# Patient Record
Sex: FEMALE | Race: WHITE | NOT HISPANIC OR LATINO | Employment: OTHER | URBAN - NONMETROPOLITAN AREA
[De-identification: names, ages, dates, MRNs, and addresses within clinical notes are randomized per-mention and may not be internally consistent; named-entity substitution may affect disease eponyms.]

---

## 2023-03-27 LAB
ANION GAP IN SER/PLAS: 11 MMOL/L (ref 10–20)
CALCIUM (MG/DL) IN SER/PLAS: 9.3 MG/DL (ref 8.6–10.3)
CARBON DIOXIDE, TOTAL (MMOL/L) IN SER/PLAS: 27 MMOL/L (ref 21–32)
CHLORIDE (MMOL/L) IN SER/PLAS: 104 MMOL/L (ref 98–107)
CREATININE (MG/DL) IN SER/PLAS: 1.16 MG/DL (ref 0.5–1.05)
GFR FEMALE: 50 ML/MIN/1.73M2
GLUCOSE (MG/DL) IN SER/PLAS: 86 MG/DL (ref 74–99)
POTASSIUM (MMOL/L) IN SER/PLAS: 4.2 MMOL/L (ref 3.5–5.3)
SODIUM (MMOL/L) IN SER/PLAS: 138 MMOL/L (ref 136–145)
UREA NITROGEN (MG/DL) IN SER/PLAS: 25 MG/DL (ref 6–23)

## 2023-07-17 LAB
ANION GAP IN SER/PLAS: 9 MMOL/L (ref 10–20)
CALCIUM (MG/DL) IN SER/PLAS: 9.5 MG/DL (ref 8.6–10.3)
CARBON DIOXIDE, TOTAL (MMOL/L) IN SER/PLAS: 28 MMOL/L (ref 21–32)
CHLORIDE (MMOL/L) IN SER/PLAS: 105 MMOL/L (ref 98–107)
CREATININE (MG/DL) IN SER/PLAS: 1.14 MG/DL (ref 0.5–1.05)
ERYTHROCYTE DISTRIBUTION WIDTH (RATIO) BY AUTOMATED COUNT: 12.6 % (ref 11.5–14.5)
ERYTHROCYTE MEAN CORPUSCULAR HEMOGLOBIN CONCENTRATION (G/DL) BY AUTOMATED: 31 G/DL (ref 32–36)
ERYTHROCYTE MEAN CORPUSCULAR VOLUME (FL) BY AUTOMATED COUNT: 100 FL (ref 80–100)
ERYTHROCYTES (10*6/UL) IN BLOOD BY AUTOMATED COUNT: 4.23 X10E12/L (ref 4–5.2)
GFR FEMALE: 51 ML/MIN/1.73M2
GLUCOSE (MG/DL) IN SER/PLAS: 90 MG/DL (ref 74–99)
HEMATOCRIT (%) IN BLOOD BY AUTOMATED COUNT: 42.2 % (ref 36–46)
HEMOGLOBIN (G/DL) IN BLOOD: 13.1 G/DL (ref 12–16)
LEUKOCYTES (10*3/UL) IN BLOOD BY AUTOMATED COUNT: 5.6 X10E9/L (ref 4.4–11.3)
PARATHYRIN INTACT (PG/ML) IN SER/PLAS: 31.7 PG/ML (ref 18.5–88)
PLATELETS (10*3/UL) IN BLOOD AUTOMATED COUNT: 294 X10E9/L (ref 150–450)
POTASSIUM (MMOL/L) IN SER/PLAS: 3.9 MMOL/L (ref 3.5–5.3)
SODIUM (MMOL/L) IN SER/PLAS: 138 MMOL/L (ref 136–145)
UREA NITROGEN (MG/DL) IN SER/PLAS: 25 MG/DL (ref 6–23)

## 2023-09-05 LAB
INR IN PPP BY COAGULATION ASSAY EXTERNAL: 2.2
PROTHROMBIN TIME (PT) IN PPP BY COAGULATION ASSAY EXTERNAL: NORMAL SECONDS

## 2023-09-14 PROBLEM — M17.11 OSTEOARTHRITIS OF RIGHT KNEE: Status: ACTIVE | Noted: 2023-09-14

## 2023-09-14 PROBLEM — R26.9 GAIT DIFFICULTY: Status: ACTIVE | Noted: 2023-09-14

## 2023-09-14 PROBLEM — I73.9 PAD (PERIPHERAL ARTERY DISEASE) (CMS-HCC): Status: ACTIVE | Noted: 2023-09-14

## 2023-09-14 PROBLEM — I82.409 DVT (DEEP VENOUS THROMBOSIS) (MULTI): Status: ACTIVE | Noted: 2023-09-14

## 2023-09-14 PROBLEM — M72.2 PLANTAR FASCIITIS, LEFT: Status: ACTIVE | Noted: 2023-09-14

## 2023-09-14 PROBLEM — I26.99 PULMONARY EMBOLISM (MULTI): Status: ACTIVE | Noted: 2023-09-14

## 2023-09-14 RX ORDER — CILOSTAZOL 50 MG/1
TABLET ORAL
COMMUNITY
Start: 2023-04-10 | End: 2023-10-04 | Stop reason: ALTCHOICE

## 2023-09-14 RX ORDER — ROSUVASTATIN CALCIUM 5 MG
0.5 TABLET ORAL DAILY
COMMUNITY
Start: 2019-10-14 | End: 2023-12-19 | Stop reason: SDUPTHER

## 2023-09-14 RX ORDER — VIT C/E/ZN/COPPR/LUTEIN/ZEAXAN 250MG-90MG
25 CAPSULE ORAL 2 TIMES DAILY
COMMUNITY

## 2023-09-14 RX ORDER — LOSARTAN POTASSIUM 25 MG/1
1 TABLET, FILM COATED ORAL DAILY
COMMUNITY

## 2023-09-14 RX ORDER — CARVEDILOL 3.12 MG/1
3.12 TABLET, FILM COATED ORAL 2 TIMES DAILY
COMMUNITY
Start: 2019-10-14 | End: 2023-12-19 | Stop reason: SDUPTHER

## 2023-09-14 RX ORDER — OFLOXACIN 3 MG/ML
SOLUTION/ DROPS OPHTHALMIC
COMMUNITY
Start: 2023-03-03 | End: 2023-10-04 | Stop reason: ALTCHOICE

## 2023-09-14 RX ORDER — LORAZEPAM 1 MG/1
1 TABLET ORAL NIGHTLY
COMMUNITY
End: 2023-10-04 | Stop reason: ALTCHOICE

## 2023-09-14 RX ORDER — EPINEPHRINE 0.22MG
100 AEROSOL WITH ADAPTER (ML) INHALATION DAILY
COMMUNITY

## 2023-09-14 RX ORDER — ASPIRIN 81 MG/1
1 TABLET ORAL DAILY
COMMUNITY

## 2023-09-14 RX ORDER — LEVOTHYROXINE SODIUM 50 UG/1
1 TABLET ORAL DAILY
COMMUNITY
End: 2023-12-19 | Stop reason: SDUPTHER

## 2023-09-14 RX ORDER — WARFARIN SODIUM 5 MG/1
1 TABLET ORAL DAILY
COMMUNITY
Start: 2022-06-14 | End: 2023-11-29 | Stop reason: WASHOUT

## 2023-09-14 RX ORDER — FAMOTIDINE 20 MG/1
20 TABLET, FILM COATED ORAL 2 TIMES DAILY
COMMUNITY
End: 2023-10-18 | Stop reason: WASHOUT

## 2023-09-14 RX ORDER — BUPROPION HYDROCHLORIDE 150 MG/1
150 TABLET, EXTENDED RELEASE ORAL 2 TIMES DAILY
COMMUNITY
Start: 2020-07-13 | End: 2023-12-19 | Stop reason: SDUPTHER

## 2023-09-14 RX ORDER — CARBIDOPA AND LEVODOPA 25; 250 MG/1; MG/1
1 TABLET ORAL 3 TIMES DAILY
COMMUNITY
Start: 2019-10-14 | End: 2023-12-19 | Stop reason: ALTCHOICE

## 2023-09-14 RX ORDER — VALACYCLOVIR HYDROCHLORIDE 500 MG/1
1000 TABLET, FILM COATED ORAL DAILY
COMMUNITY

## 2023-09-14 RX ORDER — FLUOXETINE HYDROCHLORIDE 20 MG/1
1 CAPSULE ORAL DAILY
COMMUNITY
Start: 2019-07-15 | End: 2023-12-19 | Stop reason: SDUPTHER

## 2023-09-14 RX ORDER — LEVOTHYROXINE SODIUM 25 UG/1
1 TABLET ORAL DAILY
COMMUNITY
Start: 2020-10-12 | End: 2023-10-04

## 2023-09-27 DIAGNOSIS — Z86.711 PERSONAL HISTORY OF PE (PULMONARY EMBOLISM): Primary | ICD-10-CM

## 2023-10-04 ENCOUNTER — ANTICOAGULATION - WARFARIN VISIT (OUTPATIENT)
Dept: PHARMACY | Facility: HOSPITAL | Age: 73
End: 2023-10-04
Payer: MEDICARE

## 2023-10-04 DIAGNOSIS — Z86.711 PERSONAL HISTORY OF PE (PULMONARY EMBOLISM): Primary | ICD-10-CM

## 2023-10-04 LAB
POC INR: 3
POC PROTHROMBIN TIME: NORMAL

## 2023-10-04 PROCEDURE — 99211 OFF/OP EST MAY X REQ PHY/QHP: CPT | Performed by: PHARMACIST

## 2023-10-04 PROCEDURE — 85610 PROTHROMBIN TIME: CPT | Mod: QW

## 2023-10-04 RX ORDER — CALCIUM CARBONATE/VITAMIN D3 600MG-5MCG
1 TABLET ORAL DAILY
COMMUNITY

## 2023-10-04 NOTE — PROGRESS NOTES
This is a 4 week follow up.    Last INR 2.2 on warfarin 40 mg weekly. No changes were made at that time.    Today, pt denies missed doses, medication/diet changes, no OTC/herbal supplement changes, CP/SOB, fatigue, bleeding or bruising since last visit. Dose verified.    We reviewed and reinforced steady diet and signs and symptoms of VTE. Pt will be vigilant to any increase in severe bruising or bleeding and instructed to call clinic with any questions, concerns, changes, or bleed prior to next visit.    Plan:  INR still in range but on the higher side. She denies any issues or changes so I will leave dose alone today.  Recheck in 2 weeks.

## 2023-10-18 ENCOUNTER — OFFICE VISIT (OUTPATIENT)
Dept: CARDIOLOGY | Facility: CLINIC | Age: 73
End: 2023-10-18
Payer: MEDICARE

## 2023-10-18 VITALS
BODY MASS INDEX: 33.63 KG/M2 | HEART RATE: 71 BPM | HEIGHT: 64 IN | WEIGHT: 197 LBS | DIASTOLIC BLOOD PRESSURE: 64 MMHG | OXYGEN SATURATION: 95 % | SYSTOLIC BLOOD PRESSURE: 124 MMHG

## 2023-10-18 DIAGNOSIS — I73.9 PAD (PERIPHERAL ARTERY DISEASE) (CMS-HCC): Primary | ICD-10-CM

## 2023-10-18 DIAGNOSIS — E78.5 HYPERLIPIDEMIA, UNSPECIFIED HYPERLIPIDEMIA TYPE: ICD-10-CM

## 2023-10-18 PROBLEM — D68.61 ANTIPHOSPHOLIPID ANTIBODY SYNDROME (MULTI): Status: ACTIVE | Noted: 2023-10-18

## 2023-10-18 PROCEDURE — 99213 OFFICE O/P EST LOW 20 MIN: CPT | Performed by: STUDENT IN AN ORGANIZED HEALTH CARE EDUCATION/TRAINING PROGRAM

## 2023-10-18 PROCEDURE — 1159F MED LIST DOCD IN RCRD: CPT | Performed by: STUDENT IN AN ORGANIZED HEALTH CARE EDUCATION/TRAINING PROGRAM

## 2023-10-18 PROCEDURE — 1036F TOBACCO NON-USER: CPT | Performed by: STUDENT IN AN ORGANIZED HEALTH CARE EDUCATION/TRAINING PROGRAM

## 2023-10-18 RX ORDER — EZETIMIBE 10 MG/1
10 TABLET ORAL DAILY
Qty: 90 TABLET | Refills: 3 | Status: SHIPPED | OUTPATIENT
Start: 2023-10-18 | End: 2024-10-17

## 2023-10-18 NOTE — PROGRESS NOTES
CC: CV follow up    History of Present Illness  Marine Luna is a 73 y.o. 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     She has been followed with Dr. 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 She is on Coumadin.     She participated in PAD rehab which she reports helped her walking capacity a lot.    She denies any bleeding issues.  Denies claudication, rest pain or tissue loss.    Past medical history  As above      Social History     Tobacco Use    Smoking status: Former     Packs/day: 1.00     Years: 25.00     Additional pack years: 0.00     Total pack years: 25.00     Types: Cigarettes     Quit date: 2004     Years since quittin.8    Smokeless tobacco: Never   Substance Use Topics    Alcohol use: Yes    Drug use: Never       Family History   Problem Relation Name Age of Onset    Hypertension Mother      Other (valvur heart disease) Mother      Hypertension Father      Heart attack Father      Breast cancer Sister         Review of Systems  As per HPI, all other systems reviewed and negative.    Outpatient Medications:  Current Outpatient Medications   Medication Instructions    aspirin 81 mg EC tablet 1 tablet, oral, Daily    buPROPion SR (WELLBUTRIN SR) 150 mg, oral, 2 times daily    calcium carbonate-vitamin D3 600 mg-5 mcg (200 unit) tablet 1 tablet, oral, Daily    carbidopa-levodopa (Sinemet)  mg tablet 1 tablet, oral, 3 times daily    cholecalciferol (VITAMIN D-3) 25 mcg, oral, 2 times daily    coenzyme Q-10 100 mg, oral, Daily    Coreg 3.125 mg, oral, 2 times daily    Cozaar 25 mg tablet 1 tablet, oral, Daily    Crestor 5 mg tablet 0.5 tablets, oral, Daily    docosahexaenoic acid/epa  (FISH OIL ORAL) USE AS DIRECTED.    ezetimibe (ZETIA) 10 mg, oral, Daily    fexofenadine HCl (ALLEGRA ORAL) 1 tablet, oral, Daily    FLUoxetine (PROzac) 20 mg capsule 1 tablet, oral, Daily    levothyroxine (Synthroid, Levoxyl) 50 mcg tablet 1 tablet, oral, Daily    valACYclovir (VALTREX) 1,000 mg, oral, Daily    warfarin (Coumadin) 5 mg tablet 1 tablet, oral, Daily         Vitals:  Vitals:    10/18/23 1428   BP: 124/64   Pulse: 71   SpO2: 95%       Physical Exam:  General: NAD, well-appearing  HEENT: moist mucous membranes, no jaundice  Neck: No JVD, no carotid bruit  Lungs: CTA kendra, no wheezing or rales  Cardiac: RRR, no murmurs  Abdomen: soft, non-tender, non-distended  Extremities: 2+ radial pulses, no edema, no wounds  Skin: warm, dry  Neurologic: AAOx3,  no focal deficits      Assessment/Plan     #PAD, DVT/PE  -Exercise ABIs without significant disease. Continue medical therapy with anticoagulation  -Continue compression stockings for minimal leg swelling  -Echocardiogram with normal RV size and function    #Dyslipidemia  -LDL not at goal  -Reports she can only tolerate crestor 5mg every other day  -Will add ezetimibe          Daniela Gates MD [facility]  Interventional Cardiology  Endovascular Interventions  [email or web address]    Thank you for allowing me to participate in the care of this patient. Please do not hesitate to contact me with any further questions or concerns.

## 2023-11-01 ENCOUNTER — ANTICOAGULATION - WARFARIN VISIT (OUTPATIENT)
Dept: PHARMACY | Facility: HOSPITAL | Age: 73
End: 2023-11-01
Payer: MEDICARE

## 2023-11-01 DIAGNOSIS — Z86.711 PERSONAL HISTORY OF PE (PULMONARY EMBOLISM): Primary | ICD-10-CM

## 2023-11-01 LAB
POC INR: 2.5
POC PROTHROMBIN TIME: NORMAL

## 2023-11-01 PROCEDURE — 85610 PROTHROMBIN TIME: CPT | Mod: QW

## 2023-11-01 PROCEDURE — 99211 OFF/OP EST MAY X REQ PHY/QHP: CPT | Performed by: PHARMACIST

## 2023-11-01 NOTE — PROGRESS NOTES
Pt presents to anticoag clinic for 4 week INR check.  Last INR 3 on warfarin 40 mg weekly. No changes were made at that time.     Today, pt denies missed doses, medication/diet changes, no OTC/herbal supplement changes, CP/SOB, fatigue, bleeding or bruising since last visit. Dose verified.    We reviewed and reinforced steady diet and signs and symptoms of VTE. Pt will be vigilant to any increase in severe bruising or bleeding and instructed to call clinic with any questions, concerns, changes, or bleed prior to next visit.    Plan:  Continue with current warfarin dose.  INR in 4 weeks.  Maintain consistent vegetable intake.  Continue monitoring for any troubling bruising or bleeding and call with any medication changes or concerns.    Pt handout given with above information

## 2023-11-02 ENCOUNTER — APPOINTMENT (OUTPATIENT)
Dept: PRIMARY CARE | Facility: CLINIC | Age: 73
End: 2023-11-02
Payer: MEDICARE

## 2023-11-15 ENCOUNTER — OFFICE VISIT (OUTPATIENT)
Dept: ORTHOPEDIC SURGERY | Facility: CLINIC | Age: 73
End: 2023-11-15
Payer: MEDICARE

## 2023-11-15 DIAGNOSIS — M17.11 ARTHRITIS OF RIGHT KNEE: Primary | ICD-10-CM

## 2023-11-15 PROCEDURE — 1160F RVW MEDS BY RX/DR IN RCRD: CPT | Performed by: NURSE PRACTITIONER

## 2023-11-15 PROCEDURE — 99214 OFFICE O/P EST MOD 30 MIN: CPT | Performed by: NURSE PRACTITIONER

## 2023-11-15 PROCEDURE — 1036F TOBACCO NON-USER: CPT | Performed by: NURSE PRACTITIONER

## 2023-11-15 PROCEDURE — 1159F MED LIST DOCD IN RCRD: CPT | Performed by: NURSE PRACTITIONER

## 2023-11-15 PROCEDURE — 20611 DRAIN/INJ JOINT/BURSA W/US: CPT | Performed by: NURSE PRACTITIONER

## 2023-11-15 RX ORDER — TRIAMCINOLONE ACETONIDE 40 MG/ML
40 INJECTION, SUSPENSION INTRA-ARTICULAR; INTRAMUSCULAR
Status: COMPLETED | OUTPATIENT
Start: 2023-11-15 | End: 2023-11-15

## 2023-11-15 RX ADMIN — TRIAMCINOLONE ACETONIDE 40 MG: 40 INJECTION, SUSPENSION INTRA-ARTICULAR; INTRAMUSCULAR at 10:26

## 2023-11-15 ASSESSMENT — ENCOUNTER SYMPTOMS
HEMATOLOGIC/LYMPHATIC NEGATIVE: 1
NEUROLOGICAL NEGATIVE: 1
ENDOCRINE NEGATIVE: 1
ARTHRALGIAS: 1
PSYCHIATRIC NEGATIVE: 1
CONSTITUTIONAL NEGATIVE: 1
CARDIOVASCULAR NEGATIVE: 1
RESPIRATORY NEGATIVE: 1

## 2023-11-15 ASSESSMENT — PAIN - FUNCTIONAL ASSESSMENT: PAIN_FUNCTIONAL_ASSESSMENT: NO/DENIES PAIN

## 2023-11-15 NOTE — ASSESSMENT & PLAN NOTE
We discussed benefits of cortisone injection to boost effects of the recent viscosupplementation. INR results today were of 2.5. A cortisone injection was provided and tolerated well. Positive lidocaine suppression. We reviewed as needed use of Tylenol and topical diclofenac gel. Also encouraged continued use of the compression sleeve or hinged brace with any activities that would require repetitive motion and twisting of the knee. Continue home exercises for knee OA, was encouraged to initiate in 1 week and advance as tolerated. We discussed gel injections.  Patient states she does get longer relief with gel, last dosing was earlier this year.  In April with Euflexxa.  Patient is interested in pursuing in the future.  Follow-up here in 3 months, we did discuss if patient feels she would like to pursue the gel at that time to notify via meevl approximately 2 weeks prior to the appointment and we will submit request for gel injections.  Patient is in agreement with plan of care.  This note was generated using Dragon software. It may contain errors in wording, punctuation or spelling.   Rx for compounded joint formula 8E completed and sent to [facility] pharmacy with following changed to standard formula: Lidocaine 4% and no Prilocaine.

## 2023-11-15 NOTE — PROGRESS NOTES
Subjective    Patient ID: Marine Luna is a 73 y.o. female.    Chief Complaint: Follow-up of the Right Knee    HPI  Marine is a pleasant 73-year-old female presenting today for 3-month for R knee pain, OA flare.  Patient with symptom relief with compounded Rx, requesting refill.  States she uses it every day.  Patient takes Tylenol on as needed basis.  Patient recently had her INR checked on 11/1/2023 and reports 2.5 reading.  Patient is interested in pursuing cortisone injection for symptom control.  Patient noticed flare with increased activity and weather change.  Denies any new injury or falls.    Review of Systems   Constitutional: Negative.    HENT: Negative.     Respiratory: Negative.     Cardiovascular: Negative.    Endocrine: Negative.    Musculoskeletal:  Positive for arthralgias.   Skin: Negative.    Neurological: Negative.    Hematological: Negative.    Psychiatric/Behavioral: Negative.         Objective   Right Knee Exam     Tenderness   The patient is experiencing tenderness in the medial joint line.    Range of Motion   Extension:  normal   Flexion:  120     Tests   Malika:  Medial - negative Lateral - negative  Varus: negative Valgus: negative  Lachman:  Anterior - negative      Drawer:  Anterior - negative    Posterior - negative    Other   Sensation: normal  Pulse: present  Swelling: mild    Comments:  Mild edema to medial aspect of the knee extends approximately 3 inches distally.  Full ROM of distal joints with no sx aggravation distal motor and sensory intact, cap refill at 2 seconds.  Mild aggravation of medial knee discomfort with testing, no laxity noted.          Image Results:  XR knee complete 4 or more views  Narrative: Interpreted By:  LISA SANCHEZ MD  MRN: [id number]  Patient Name: MARINE LUNA     STUDY:  KNEE; COMPLT, 4 OR MORE VIEWS     INDICATION:  PAIN  M17.11: Osteoarthritis of right knee.     COMPARISON:  None     ACCESSION NUMBER(S):  [id number]     ORDERING  CLINICIAN:  ANTHONY HESS     FINDINGS:  November 16, 2020 Advanced osteoarthritis right knee mostly medial  compartment. No fracture seen. No osseous lesions.     Impression: Advanced osteoarthritis right knee particularly in the medial  compartment.    .lab  Patient ID: Marine Luna is a 73 y.o. female.    L Inj/Asp: R knee on 11/15/2023 10:26 AM  Indications: pain and joint swelling  Details: 22 G needle, ultrasound-guided superolateral approach  Medications: 40 mg triamcinolone acetonide 40 mg/mL  Outcome: tolerated well, no immediate complications    We discussed risk and benefits of cortisone injection, patient wishes to proceed via verbal consent.  Skin was prepped with Betadine, Vapocoolant spray and alcohol.  Direct visualization using high-frequency linear probe of ultrasound was utilized to administer the injection of 40 mg Kenalog, 3 cc 1% lidocaine and 3 cc of bupivacaine.  Patient tolerated injection well lidocaine suppression.  Images were saved under MRN number into the PACS system.   Procedure, treatment alternatives, risks and benefits explained, specific risks discussed. Consent was given by the patient. Immediately prior to procedure a time out was called to verify the correct patient, procedure, equipment, support staff and site/side marked as required. Patient was prepped and draped in the usual sterile fashion.       Lab review: INR 11/1/2023 2.50    Assessment/Plan   Encounter Diagnoses:  Arthritis of right knee  Problem List Items Addressed This Visit             ICD-10-CM    Arthritis of right knee - Primary M17.11     We discussed benefits of cortisone injection to boost effects of the recent viscosupplementation. INR results today were of 2.5. A cortisone injection was provided and tolerated well. Positive lidocaine suppression. We reviewed as needed use of Tylenol and topical diclofenac gel. Also encouraged continued use of the compression sleeve or hinged brace with any  activities that would require repetitive motion and twisting of the knee. Continue home exercises for knee OA, was encouraged to initiate in 1 week and advance as tolerated. We discussed gel injections.  Patient states she does get longer relief with gel, last dosing was earlier this year.  In April with Euflexxa.  Patient is interested in pursuing in the future.  Follow-up here in 3 months, we did discuss if patient feels she would like to pursue the gel at that time to notify via [employer] approximately 2 weeks prior to the appointment and we will submit request for gel injections.  Patient is in agreement with plan of care.  This note was generated using Dragon software. It may contain errors in wording, punctuation or spelling.             Relevant Orders    Point of Care Ultrasound (Completed)    Follow Up In Orthopaedic Surgery

## 2023-11-21 ENCOUNTER — TELEPHONE (OUTPATIENT)
Dept: ORTHOPEDIC SURGERY | Facility: CLINIC | Age: 73
End: 2023-11-21
Payer: MEDICARE

## 2023-11-21 NOTE — TELEPHONE ENCOUNTER
Patient called and stated that the Diclofenac gel discussed in her visit was not at the pharmacy when she called. Please send the RX and update the patient.

## 2023-11-29 ENCOUNTER — ANTICOAGULATION - WARFARIN VISIT (OUTPATIENT)
Dept: PHARMACY | Facility: HOSPITAL | Age: 73
End: 2023-11-29
Payer: MEDICARE

## 2023-11-29 DIAGNOSIS — Z86.711 PERSONAL HISTORY OF PE (PULMONARY EMBOLISM): Primary | ICD-10-CM

## 2023-11-29 LAB
POC INR: 2.2
POC PROTHROMBIN TIME: NORMAL

## 2023-11-29 PROCEDURE — 85610 PROTHROMBIN TIME: CPT

## 2023-11-29 PROCEDURE — 99211 OFF/OP EST MAY X REQ PHY/QHP: CPT | Mod: 25 | Performed by: PHARMACIST

## 2023-11-29 RX ORDER — WARFARIN SODIUM 5 MG/1
TABLET ORAL
Qty: 135 TABLET | Refills: 3 | Status: SHIPPED | OUTPATIENT
Start: 2023-11-29 | End: 2024-07-26

## 2023-11-29 NOTE — PROGRESS NOTES
This is a 4 week follow up.    Last INR 2.5 on warfarin 40 mg weekly. No changes were made at that time.     Today, pt denies missed doses, medication/diet changes, no OTC/herbal supplement changes, CP/SOB, fatigue, bleeding or bruising since last visit. Dose verified.    We reviewed and reinforced steady diet and signs and symptoms of VTE. Pt will be vigilant to any increase in severe bruising or bleeding and instructed to call clinic with any questions, concerns, changes, or bleed prior to next visit.    Plan:  Continue with current warfarin dose.  INR in 4 weeks.  New RX sent to [facility].  Maintain consistent vegetable intake.  Continue monitoring for any troubling bruising or bleeding and call with any medication changes or concerns.    Pt handout given with above information

## 2023-12-13 ENCOUNTER — LAB (OUTPATIENT)
Dept: LAB | Facility: LAB | Age: 73
End: 2023-12-13
Payer: MEDICARE

## 2023-12-13 DIAGNOSIS — I82.90 DEEP VEIN THROMBOSIS (DVT) OF NON-EXTREMITY VEIN, UNSPECIFIED CHRONICITY: ICD-10-CM

## 2023-12-13 LAB
ALBUMIN SERPL BCP-MCNC: 4.4 G/DL (ref 3.4–5)
ALP SERPL-CCNC: 55 U/L (ref 33–136)
ALT SERPL W P-5'-P-CCNC: 13 U/L (ref 7–45)
ANION GAP SERPL CALC-SCNC: 12 MMOL/L (ref 10–20)
AST SERPL W P-5'-P-CCNC: 13 U/L (ref 9–39)
BASOPHILS # BLD AUTO: 0.06 X10*3/UL (ref 0–0.1)
BASOPHILS NFR BLD AUTO: 0.9 %
BILIRUB SERPL-MCNC: 0.3 MG/DL (ref 0–1.2)
BUN SERPL-MCNC: 23 MG/DL (ref 6–23)
CALCIUM SERPL-MCNC: 9.3 MG/DL (ref 8.6–10.3)
CHLORIDE SERPL-SCNC: 103 MMOL/L (ref 98–107)
CO2 SERPL-SCNC: 28 MMOL/L (ref 21–32)
CREAT SERPL-MCNC: 1.15 MG/DL (ref 0.5–1.05)
D DIMER PPP FEU-MCNC: 221 NG/ML FEU
EOSINOPHIL # BLD AUTO: 0.27 X10*3/UL (ref 0–0.4)
EOSINOPHIL NFR BLD AUTO: 4 %
ERYTHROCYTE [DISTWIDTH] IN BLOOD BY AUTOMATED COUNT: 13.4 % (ref 11.5–14.5)
GFR SERPL CREATININE-BSD FRML MDRD: 51 ML/MIN/1.73M*2
GLUCOSE SERPL-MCNC: 97 MG/DL (ref 74–99)
HCT VFR BLD AUTO: 42.7 % (ref 36–46)
HGB BLD-MCNC: 13.2 G/DL (ref 12–16)
IMM GRANULOCYTES # BLD AUTO: 0.03 X10*3/UL (ref 0–0.5)
IMM GRANULOCYTES NFR BLD AUTO: 0.4 % (ref 0–0.9)
LYMPHOCYTES # BLD AUTO: 1.65 X10*3/UL (ref 0.8–3)
LYMPHOCYTES NFR BLD AUTO: 24.3 %
MCH RBC QN AUTO: 30.3 PG (ref 26–34)
MCHC RBC AUTO-ENTMCNC: 30.9 G/DL (ref 32–36)
MCV RBC AUTO: 98 FL (ref 80–100)
MONOCYTES # BLD AUTO: 0.65 X10*3/UL (ref 0.05–0.8)
MONOCYTES NFR BLD AUTO: 9.6 %
NEUTROPHILS # BLD AUTO: 4.14 X10*3/UL (ref 1.6–5.5)
NEUTROPHILS NFR BLD AUTO: 60.8 %
NRBC BLD-RTO: 0 /100 WBCS (ref 0–0)
PLATELET # BLD AUTO: 303 X10*3/UL (ref 150–450)
POTASSIUM SERPL-SCNC: 4.2 MMOL/L (ref 3.5–5.3)
PROT SERPL-MCNC: 6.7 G/DL (ref 6.4–8.2)
RBC # BLD AUTO: 4.35 X10*6/UL (ref 4–5.2)
SODIUM SERPL-SCNC: 139 MMOL/L (ref 136–145)
WBC # BLD AUTO: 6.8 X10*3/UL (ref 4.4–11.3)

## 2023-12-13 PROCEDURE — 80053 COMPREHEN METABOLIC PANEL: CPT

## 2023-12-13 PROCEDURE — 85025 COMPLETE CBC W/AUTO DIFF WBC: CPT

## 2023-12-13 PROCEDURE — 85379 FIBRIN DEGRADATION QUANT: CPT

## 2023-12-13 PROCEDURE — 36415 COLL VENOUS BLD VENIPUNCTURE: CPT

## 2023-12-13 NOTE — PROGRESS NOTES
Patient ID: Marine Luna is a 73 y.o. female.    Subjective    HPI  Visit Type: Follow Up Visit      History of Present Illness:      ID Statement:    MARINE LUNA is a 73 year old Female        Chief Complaint: Evaluation for PE   Interval History:    Referred by Jose C Liang MD      Reason for referral: PE      HPI  71 year old woman with hx of GERD, HL, depression/anxiety, HTN who presented to ER on 2/7/22 with back pain and abdominal pain, a CT C/A/P  showed acute bilateral PE with b/l GGO possibly infarcts, she had also dopplers of b/l LE that showed b/l acute DVTs. She was started on heparin and discharged on Eliquis, she was prior to that on Plavix and cilostazol which was stopped.      she had pain in the LE b/l, then se started having difficulty breathing, she was finally convinced to go to the hospital where she had above investigations      in 2007 he had similar symptoms, with blue toes, but at that time she had embolic event to the toe     she feels she is improved now her back pain is better and her breathing is better   her feet and legs are hurting more , attributes that to not taking plavix or cilostazol   she is active and up on her feet but has WILSON     she has been eating well,   no weight loss   tolerating eliquis well without bleeding   no fever or infections   no night sweats      she had MMG last year that was negative and will have her yearly one this   no recent pap smear   had colonoscopy in 2013      she was found to have positive APLA and was switched to coumadin      interval history - 12/14/23     She is on warfarin- 5mg everyday and on Friday and Monday takes 1.5 pills  INR has been mostly therapeutic   continues to bruise easy     Energy is doing well, remains to stay active by doing [employer]  Appetite is good, eating and drinking well   remains to have WILSON going up stairs, otherwise no breathing issues   No chest pain or pressure      No hematochezia or melena  No urinary  issues, no hematuria      Knee and ankle pain, unchanged   restless leg remains unchanged, takes sinemet for control   No swelling or LLE pain, continues to wear compression stockings     Objective    BSA: There is no height or weight on file to calculate BSA.  There were no vitals taken for this visit.     Physical Exam  Vitals and nursing note reviewed.   Constitutional:       General: She is not in acute distress.     Appearance: Normal appearance.   HENT:      Mouth/Throat:      Mouth: Mucous membranes are moist.      Pharynx: Oropharynx is clear.   Eyes:      General: No scleral icterus.     Extraocular Movements: Extraocular movements intact.      Conjunctiva/sclera: Conjunctivae normal.   Cardiovascular:      Rate and Rhythm: Normal rate and regular rhythm.      Pulses: Normal pulses.      Heart sounds: Normal heart sounds.   Pulmonary:      Effort: Pulmonary effort is normal. No respiratory distress.      Breath sounds: Normal breath sounds.   Abdominal:      General: There is no distension.      Palpations: Abdomen is soft. There is no mass.      Tenderness: There is no abdominal tenderness.   Musculoskeletal:         General: Normal range of motion.      Cervical back: Normal range of motion.   Skin:     General: Skin is warm and dry.   Neurological:      General: No focal deficit present.      Mental Status: She is alert and oriented to person, place, and time.   Psychiatric:         Mood and Affect: Mood normal.         Behavior: Behavior normal.         Thought Content: Thought content normal.         Judgment: Judgment normal.         Performance Status:  Asymptomatic      Assessment/Plan      Assessment:    1. Pulmonary embolism/ DVT - likely APLS     unprovoked DVT/ PE      recommend lifelong anticoagulation with periodic assessment of bleeding risk , continue Eliquis 5 mg twice a day, after 6-12 months of anticoagulation the dose can reduced to 2.5 mg twice a day      plan to check D-dimer, APLA ,  PTG an FVL mutation, protein C&S , AT levels due to hx of both arterial and venous events      5/31/22- the evaluation showed strongly positive triple positive APLA   her repeat testing confirms diagnosis      6/15/23- Remains on Warfarin and follows with Coumadin Clinic. PT/INRs have mostly been with in therapeutic range up until recently which was PT- 21.5, INR - 1.8. Patient was dose adjusted accordingly by coumadin clinic.  She denies any abnormal bleeding  or bruising problems. Her D-dimer is with in normal limits. She is tolerating warfarin with no issues. Will continue to monitor pt.     Hgb is stable at 13.2. Previously discussed checking additional lab for nutritional deficiency such as iron or b12. Since her labs have normalized, will hold on any additional labs at this time. Will continue to periodically see patient for bleeding risk  assessments.      Educated patient on calling or  visit closest ER with any persistent headaches, visual disturbances, weakness, numbness, severe bleeding/bruising or any concerning signs or symptoms.  - May need to wear a medical alert bracelet stating the use of anticoagulants.  - To call and schedule appt with one of our physicians for pre-operative clearance and recommendations at least 1 month before any invasive procedures/surgeries    12/14/23- patient was in office today for routine follow-up. Remains on warfarin. PT/INRs have been stable. No bleeding risk or concerns. Patient remains to bruise easily. Over patient is doing well.     Continue on warfarin- managed by coumadin clinic. Will continue to follow patient for periodic bleeding risk assessments.     Labs reviewed and remains stable.  12-13-23 Hg 13.2, WBC 6.8, Plts 303k, D-dimer 221        2. History of PAD   continue vascular follow up   she is restarted on cilostazol but not plavix      RTC 6 m with labs (CBC w/diff, CMP, D-dimer)          Rona Lang, ABRAM-CNP

## 2023-12-14 ENCOUNTER — OFFICE VISIT (OUTPATIENT)
Dept: HEMATOLOGY/ONCOLOGY | Facility: CLINIC | Age: 73
End: 2023-12-14
Payer: MEDICARE

## 2023-12-14 VITALS
OXYGEN SATURATION: 95 % | WEIGHT: 195.2 LBS | DIASTOLIC BLOOD PRESSURE: 67 MMHG | HEART RATE: 63 BPM | BODY MASS INDEX: 33.32 KG/M2 | HEIGHT: 64 IN | TEMPERATURE: 96.6 F | SYSTOLIC BLOOD PRESSURE: 144 MMHG | RESPIRATION RATE: 16 BRPM

## 2023-12-14 DIAGNOSIS — I82.90 DEEP VEIN THROMBOSIS (DVT) OF NON-EXTREMITY VEIN, UNSPECIFIED CHRONICITY: Primary | ICD-10-CM

## 2023-12-14 PROCEDURE — 1036F TOBACCO NON-USER: CPT

## 2023-12-14 PROCEDURE — 99213 OFFICE O/P EST LOW 20 MIN: CPT

## 2023-12-14 PROCEDURE — 1126F AMNT PAIN NOTED NONE PRSNT: CPT

## 2023-12-14 PROCEDURE — 1159F MED LIST DOCD IN RCRD: CPT

## 2023-12-14 PROCEDURE — 1160F RVW MEDS BY RX/DR IN RCRD: CPT

## 2023-12-14 ASSESSMENT — PAIN SCALES - GENERAL: PAINLEVEL: 0-NO PAIN

## 2023-12-14 NOTE — PROGRESS NOTES
Registrant scheduled pt for follow up  Pt instructed to get labs at the hosp prior  Reviewed AVS with patient- patient verbalizes understanding

## 2023-12-14 NOTE — PATIENT INSTRUCTIONS
Remain on Coumadin, managed with Anticoagulation Clinic     Plan:  RTC in 6 months with labs prior

## 2023-12-19 ENCOUNTER — OFFICE VISIT (OUTPATIENT)
Dept: PRIMARY CARE | Facility: CLINIC | Age: 73
End: 2023-12-19
Payer: MEDICARE

## 2023-12-19 VITALS
HEART RATE: 83 BPM | HEIGHT: 64 IN | BODY MASS INDEX: 33.29 KG/M2 | SYSTOLIC BLOOD PRESSURE: 138 MMHG | WEIGHT: 195 LBS | DIASTOLIC BLOOD PRESSURE: 70 MMHG

## 2023-12-19 DIAGNOSIS — Z23 NEED FOR INFLUENZA VACCINATION: ICD-10-CM

## 2023-12-19 DIAGNOSIS — E03.9 ACQUIRED HYPOTHYROIDISM: Primary | ICD-10-CM

## 2023-12-19 DIAGNOSIS — G25.81 RLS (RESTLESS LEGS SYNDROME): ICD-10-CM

## 2023-12-19 DIAGNOSIS — I82.4Y9 DEEP VEIN THROMBOSIS (DVT) OF PROXIMAL LOWER EXTREMITY, UNSPECIFIED CHRONICITY, UNSPECIFIED LATERALITY (MULTI): ICD-10-CM

## 2023-12-19 DIAGNOSIS — D68.61 ANTIPHOSPHOLIPID ANTIBODY SYNDROME (MULTI): ICD-10-CM

## 2023-12-19 DIAGNOSIS — R19.5 POSITIVE COLORECTAL CANCER SCREENING USING COLOGUARD TEST: ICD-10-CM

## 2023-12-19 DIAGNOSIS — R53.82 CHRONIC FATIGUE: ICD-10-CM

## 2023-12-19 DIAGNOSIS — Z12.11 SCREENING FOR COLON CANCER: ICD-10-CM

## 2023-12-19 DIAGNOSIS — Z78.0 ENCOUNTER FOR OSTEOPOROSIS SCREENING IN ASYMPTOMATIC POSTMENOPAUSAL PATIENT: ICD-10-CM

## 2023-12-19 DIAGNOSIS — F41.9 ANXIETY AND DEPRESSION: ICD-10-CM

## 2023-12-19 DIAGNOSIS — F32.A ANXIETY AND DEPRESSION: ICD-10-CM

## 2023-12-19 DIAGNOSIS — I27.82 OTHER CHRONIC PULMONARY EMBOLISM, UNSPECIFIED WHETHER ACUTE COR PULMONALE PRESENT (MULTI): ICD-10-CM

## 2023-12-19 DIAGNOSIS — E78.2 MIXED HYPERLIPIDEMIA: ICD-10-CM

## 2023-12-19 DIAGNOSIS — I73.9 PAD (PERIPHERAL ARTERY DISEASE) (CMS-HCC): ICD-10-CM

## 2023-12-19 DIAGNOSIS — Z12.31 ENCOUNTER FOR SCREENING MAMMOGRAM FOR MALIGNANT NEOPLASM OF BREAST: ICD-10-CM

## 2023-12-19 DIAGNOSIS — Z13.820 ENCOUNTER FOR OSTEOPOROSIS SCREENING IN ASYMPTOMATIC POSTMENOPAUSAL PATIENT: ICD-10-CM

## 2023-12-19 DIAGNOSIS — E66.01 MORBID (SEVERE) OBESITY DUE TO EXCESS CALORIES (MULTI): ICD-10-CM

## 2023-12-19 DIAGNOSIS — E55.9 VITAMIN D DEFICIENCY: ICD-10-CM

## 2023-12-19 PROCEDURE — 1160F RVW MEDS BY RX/DR IN RCRD: CPT | Performed by: INTERNAL MEDICINE

## 2023-12-19 PROCEDURE — 99214 OFFICE O/P EST MOD 30 MIN: CPT | Performed by: INTERNAL MEDICINE

## 2023-12-19 PROCEDURE — 1159F MED LIST DOCD IN RCRD: CPT | Performed by: INTERNAL MEDICINE

## 2023-12-19 PROCEDURE — 1126F AMNT PAIN NOTED NONE PRSNT: CPT | Performed by: INTERNAL MEDICINE

## 2023-12-19 PROCEDURE — 1036F TOBACCO NON-USER: CPT | Performed by: INTERNAL MEDICINE

## 2023-12-19 RX ORDER — CILOSTAZOL 50 MG/1
50 TABLET ORAL 2 TIMES DAILY
COMMUNITY
End: 2023-12-19 | Stop reason: SDUPTHER

## 2023-12-19 RX ORDER — CARVEDILOL 3.12 MG/1
3.12 TABLET, FILM COATED ORAL
Qty: 180 TABLET | Refills: 3 | Status: SHIPPED | OUTPATIENT
Start: 2023-12-19

## 2023-12-19 RX ORDER — FLUOXETINE HYDROCHLORIDE 20 MG/1
20 CAPSULE ORAL DAILY
Qty: 90 CAPSULE | Refills: 3 | Status: SHIPPED | OUTPATIENT
Start: 2023-12-19

## 2023-12-19 RX ORDER — LEVOTHYROXINE SODIUM 50 UG/1
50 TABLET ORAL DAILY
Qty: 90 TABLET | Refills: 3 | Status: SHIPPED | OUTPATIENT
Start: 2023-12-19

## 2023-12-19 RX ORDER — CILOSTAZOL 50 MG/1
50 TABLET ORAL 2 TIMES DAILY
Qty: 90 TABLET | Refills: 3 | Status: SHIPPED | OUTPATIENT
Start: 2023-12-19 | End: 2024-06-04

## 2023-12-19 RX ORDER — BUPROPION HYDROCHLORIDE 150 MG/1
150 TABLET, EXTENDED RELEASE ORAL DAILY
Qty: 90 TABLET | Refills: 3 | Status: SHIPPED | OUTPATIENT
Start: 2023-12-19

## 2023-12-19 RX ORDER — CARBIDOPA AND LEVODOPA 25; 250 MG/1; MG/1
1 TABLET ORAL 3 TIMES DAILY
Qty: 90 TABLET | Refills: 3 | Status: SHIPPED | OUTPATIENT
Start: 2023-12-19 | End: 2024-02-21

## 2023-12-19 RX ORDER — ROSUVASTATIN CALCIUM 5 MG
2.5 TABLET ORAL DAILY
Qty: 90 TABLET | Refills: 3 | Status: SHIPPED | OUTPATIENT
Start: 2023-12-19

## 2023-12-19 ASSESSMENT — ENCOUNTER SYMPTOMS
COUGH: 0
BLOOD IN STOOL: 0
HEADACHES: 0
FREQUENCY: 0
ABDOMINAL PAIN: 0
SORE THROAT: 0
ABDOMINAL DISTENTION: 0
ARTHRALGIAS: 0
NERVOUS/ANXIOUS: 0
APPETITE CHANGE: 0
EYE DISCHARGE: 0
RHINORRHEA: 0
NAUSEA: 0
TROUBLE SWALLOWING: 0
WEAKNESS: 0
VOMITING: 0
SINUS PRESSURE: 0
DYSURIA: 0
UNEXPECTED WEIGHT CHANGE: 0
CONSTIPATION: 0
FEVER: 0
FATIGUE: 0
NUMBNESS: 0
DIARRHEA: 0
NECK PAIN: 0
SHORTNESS OF BREATH: 0
DIZZINESS: 0
ACTIVITY CHANGE: 0
HALLUCINATIONS: 0
BACK PAIN: 0

## 2023-12-19 ASSESSMENT — PATIENT HEALTH QUESTIONNAIRE - PHQ9
SUM OF ALL RESPONSES TO PHQ9 QUESTIONS 1 AND 2: 2
1. LITTLE INTEREST OR PLEASURE IN DOING THINGS: SEVERAL DAYS
2. FEELING DOWN, DEPRESSED OR HOPELESS: SEVERAL DAYS

## 2023-12-19 NOTE — PROGRESS NOTES
"Subjective   Patient ID: Marine Luna is a 73 y.o. female who presents for Establish Care (NP/EST CARE).  HPI  Patient is a 73 y.o. female patient who is here today to establish care.   Pt has a pmhx of HTN, CKD Stage 3a, Hypothyroidism, DVTs, HLD, htn heart disease, antiphospholipid syndrome, depression, PAD.    Review of Systems   Constitutional:  Negative for activity change, appetite change, fatigue, fever and unexpected weight change.   HENT:  Negative for congestion, ear discharge, ear pain, nosebleeds, postnasal drip, rhinorrhea, sinus pressure, sneezing, sore throat, tinnitus and trouble swallowing.    Eyes:  Negative for discharge.   Respiratory:  Negative for cough and shortness of breath.    Cardiovascular:  Negative for chest pain.   Gastrointestinal:  Negative for abdominal distention, abdominal pain, blood in stool, constipation, diarrhea, nausea and vomiting.   Endocrine: Negative for cold intolerance.   Genitourinary:  Negative for dysuria and frequency.   Musculoskeletal:  Negative for arthralgias, back pain and neck pain.   Skin:  Negative for rash.   Neurological:  Negative for dizziness, weakness, numbness and headaches.   Psychiatric/Behavioral:  Negative for hallucinations. The patient is not nervous/anxious.        Objective   /70   Pulse 83   Ht 1.626 m (5' 4.02\")   Wt 88.5 kg (195 lb)   BMI 33.45 kg/m²     Physical Exam  Constitutional:       General: She is not in acute distress.     Appearance: Normal appearance.   HENT:      Head: Normocephalic.      Nose: Nose normal.      Mouth/Throat:      Pharynx: No oropharyngeal exudate.   Eyes:      General:         Right eye: No discharge.         Left eye: No discharge.      Extraocular Movements: Extraocular movements intact.      Pupils: Pupils are equal, round, and reactive to light.   Cardiovascular:      Rate and Rhythm: Normal rate and regular rhythm.      Heart sounds: No murmur heard.     No gallop.   Pulmonary:      " Effort: Pulmonary effort is normal. No respiratory distress.      Breath sounds: Normal breath sounds. No wheezing.   Musculoskeletal:         General: No swelling. Normal range of motion.   Skin:     General: Skin is warm and dry.      Coloration: Skin is not jaundiced.   Neurological:      General: No focal deficit present.      Mental Status: She is alert and oriented to person, place, and time.      Cranial Nerves: No cranial nerve deficit.   Psychiatric:         Mood and Affect: Mood normal.         Behavior: Behavior normal.         Mammo 2020, will order   DEXA 2020, will order   Colonoscopy 2013, will order cologuard   Pap 2017?, not needed anymore    Flu shot 2022, will give today   COVID received   PNA received   Shingles zostavax, received 1st shingrix and had outbreak of shingles   RSV recommended     Assessment/Plan   Problem List Items Addressed This Visit       DVT (deep venous thrombosis) (CMS/HCC)    PAD (peripheral artery disease) (CMS/[facility])    Relevant Medications    cilostazol (Pletal) 50 mg tablet    Crestor 5 mg tablet    Coreg 3.125 mg tablet    Pulmonary embolism (CMS/HCC)    Antiphospholipid antibody syndrome (CMS/HCC)    Relevant Medications    cilostazol (Pletal) 50 mg tablet    Morbid (severe) obesity due to excess calories (CMS/HCC)    Acquired hypothyroidism - Primary    Relevant Medications    levothyroxine (Synthroid, Levoxyl) 50 mcg tablet    Other Relevant Orders    TSH with reflex to Free T4 if abnormal    Chronic fatigue    Relevant Orders    Vitamin B12    Vitamin D 25-Hydroxy,Total (for eval of Vitamin D levels)    Vitamin D deficiency    Relevant Orders    Vitamin D 25-Hydroxy,Total (for eval of Vitamin D levels)    Encounter for osteoporosis screening in asymptomatic postmenopausal patient    Relevant Orders    XR DEXA bone density    Need for influenza vaccination    Relevant Orders    Flu vaccine, quadrivalent, high-dose, preservative free, age 65y+ (FLUZONE)    Anxiety and  depression    Relevant Medications    buPROPion SR (Wellbutrin SR) 150 mg 12 hr tablet    FLUoxetine (PROzac) 20 mg capsule     Other Visit Diagnoses       Screening for colon cancer        Relevant Orders    Cologuard® colon cancer screening          Hypertensive heart disease, HTN, HLD, PAD   - hx of PAD with likely embolic in nature with possibly thrombus in the distal aorta and right illiac in 2006  - on cilostazal 50mg po daily     2. Unprovoked DVT with bilateral PE in 2/22   - following with Hematology and Vascular   - + for antiphospholipid   - on coumadin      3. Anxiety and depression   - on wellbutrin 150mg po daily   - continue prozac 20mg po daily   - uses lorazepam sparingly     4. Hypothyroidism   - on synthroid     5. Fatigue   - will check tsh, b12 and vit d     6. Former smoker   Smoked 30 years and smoked 3/4 ppd, quit in 2004     Final diagnoses:   [E03.9] Acquired hypothyroidism   [R53.82] Chronic fatigue   [E55.9] Vitamin D deficiency   [Z12.31] Encounter for screening mammogram for malignant neoplasm of breast   [Z13.820, Z78.0] Encounter for osteoporosis screening in asymptomatic postmenopausal patient   [Z12.11] Screening for colon cancer   [Z23] Need for influenza vaccination   [I73.9] PAD (peripheral artery disease) (CMS/HCC)   [F41.9, F32.A] Anxiety and depression   [E66.01] Morbid (severe) obesity due to excess calories (CMS/HCC)   [I27.82] Other chronic pulmonary embolism, unspecified whether acute cor pulmonale present (CMS/HCC)   [I82.4Y9] Deep vein thrombosis (DVT) of proximal lower extremity, unspecified chronicity, unspecified laterality (CMS/HCC)   [D68.61] Antiphospholipid antibody syndrome (CMS/HCC)

## 2024-01-03 ENCOUNTER — ANCILLARY PROCEDURE (OUTPATIENT)
Dept: RADIOLOGY | Facility: CLINIC | Age: 74
End: 2024-01-03
Payer: MEDICARE

## 2024-01-03 ENCOUNTER — LAB (OUTPATIENT)
Dept: LAB | Facility: LAB | Age: 74
End: 2024-01-03
Payer: MEDICARE

## 2024-01-03 ENCOUNTER — ANTICOAGULATION - WARFARIN VISIT (OUTPATIENT)
Dept: PHARMACY | Facility: HOSPITAL | Age: 74
End: 2024-01-03
Payer: MEDICARE

## 2024-01-03 DIAGNOSIS — Z86.711 PERSONAL HISTORY OF PE (PULMONARY EMBOLISM): Primary | ICD-10-CM

## 2024-01-03 DIAGNOSIS — R53.82 CHRONIC FATIGUE: ICD-10-CM

## 2024-01-03 DIAGNOSIS — E78.2 MIXED HYPERLIPIDEMIA: ICD-10-CM

## 2024-01-03 DIAGNOSIS — E55.9 VITAMIN D DEFICIENCY: ICD-10-CM

## 2024-01-03 DIAGNOSIS — Z12.31 ENCOUNTER FOR SCREENING MAMMOGRAM FOR MALIGNANT NEOPLASM OF BREAST: ICD-10-CM

## 2024-01-03 DIAGNOSIS — E03.9 ACQUIRED HYPOTHYROIDISM: ICD-10-CM

## 2024-01-03 DIAGNOSIS — Z13.820 ENCOUNTER FOR OSTEOPOROSIS SCREENING IN ASYMPTOMATIC POSTMENOPAUSAL PATIENT: ICD-10-CM

## 2024-01-03 DIAGNOSIS — I73.9 PAD (PERIPHERAL ARTERY DISEASE) (CMS-HCC): ICD-10-CM

## 2024-01-03 DIAGNOSIS — Z78.0 ENCOUNTER FOR OSTEOPOROSIS SCREENING IN ASYMPTOMATIC POSTMENOPAUSAL PATIENT: ICD-10-CM

## 2024-01-03 LAB
25(OH)D3 SERPL-MCNC: 27 NG/ML (ref 30–100)
CHOLEST SERPL-MCNC: 218 MG/DL (ref 0–199)
CHOLESTEROL/HDL RATIO: 3.1
HDLC SERPL-MCNC: 70 MG/DL
LDLC SERPL CALC-MCNC: 125 MG/DL
NON HDL CHOLESTEROL: 148 MG/DL (ref 0–149)
POC INR: 2.7
POC PROTHROMBIN TIME: NORMAL
T4 FREE SERPL-MCNC: 0.85 NG/DL (ref 0.61–1.12)
TRIGL SERPL-MCNC: 115 MG/DL (ref 0–149)
TSH SERPL-ACNC: 4.1 MIU/L (ref 0.44–3.98)
VIT B12 SERPL-MCNC: 330 PG/ML (ref 211–911)
VLDL: 23 MG/DL (ref 0–40)

## 2024-01-03 PROCEDURE — 84443 ASSAY THYROID STIM HORMONE: CPT

## 2024-01-03 PROCEDURE — 85610 PROTHROMBIN TIME: CPT | Mod: QW

## 2024-01-03 PROCEDURE — 77067 SCR MAMMO BI INCL CAD: CPT

## 2024-01-03 PROCEDURE — 84439 ASSAY OF FREE THYROXINE: CPT

## 2024-01-03 PROCEDURE — 77067 SCR MAMMO BI INCL CAD: CPT | Performed by: RADIOLOGY

## 2024-01-03 PROCEDURE — 77085 DXA BONE DENSITY AXL VRT FX: CPT | Performed by: RADIOLOGY

## 2024-01-03 PROCEDURE — 80061 LIPID PANEL: CPT

## 2024-01-03 PROCEDURE — 82306 VITAMIN D 25 HYDROXY: CPT

## 2024-01-03 PROCEDURE — 77063 BREAST TOMOSYNTHESIS BI: CPT | Performed by: RADIOLOGY

## 2024-01-03 PROCEDURE — 77080 DXA BONE DENSITY AXIAL: CPT

## 2024-01-03 PROCEDURE — 36415 COLL VENOUS BLD VENIPUNCTURE: CPT

## 2024-01-03 PROCEDURE — 82607 VITAMIN B-12: CPT

## 2024-01-03 PROCEDURE — 99211 OFF/OP EST MAY X REQ PHY/QHP: CPT | Mod: 25 | Performed by: PHARMACIST

## 2024-01-11 DIAGNOSIS — I73.9 PAD (PERIPHERAL ARTERY DISEASE) (CMS-HCC): Primary | ICD-10-CM

## 2024-01-11 RX ORDER — CARVEDILOL 6.25 MG/1
3.12 TABLET ORAL
Qty: 90 TABLET | Refills: 3 | Status: SHIPPED | OUTPATIENT
Start: 2024-01-11 | End: 2025-01-10

## 2024-01-18 LAB — NONINV COLON CA DNA+OCC BLD SCRN STL QL: POSITIVE

## 2024-02-07 ENCOUNTER — ANTICOAGULATION - WARFARIN VISIT (OUTPATIENT)
Dept: PHARMACY | Facility: HOSPITAL | Age: 74
End: 2024-02-07
Payer: MEDICARE

## 2024-02-07 DIAGNOSIS — Z86.711 PERSONAL HISTORY OF PE (PULMONARY EMBOLISM): Primary | ICD-10-CM

## 2024-02-07 LAB
POC INR: 3.2
POC PROTHROMBIN TIME: NORMAL

## 2024-02-07 PROCEDURE — 99211 OFF/OP EST MAY X REQ PHY/QHP: CPT | Mod: 25

## 2024-02-07 PROCEDURE — 85610 PROTHROMBIN TIME: CPT | Mod: QW

## 2024-02-07 NOTE — PROGRESS NOTES
Pt presents to [facility] clinic for  management of personal history of PE   Current INR of 3.2 is slightly supratherapeutic for goal range of 2-3. It was collected after 5 weeks and is reflective of 40 mg total weekly dose    Patient reports 0 missed doses    Patient denies any medication changes, diet changes, or OTC/herbal supplement changes since last visit.  Patient denies any CP/SOB, fatigue, bleeding or bruising since last visit.   Patient denies any change in alcohol or tobacco use since last visit.   Patient denies any upcoming medical or dental procedures.    Patient is getting silicone shots in her knees the week of 12/18. This will occur once a week for 3 weeks. Her INR needs to be <3 in order to receive the injections.    Patient also needs to schedule a colonoscopy. She did a Cologaurd ordered by Dr. Costello which showed the presence of blood. She plans to call Dr. Young today and schedule a colonoscopy to occur after her injections.     Plan:  Patient was instructed to take 2.5 mg today then return to current regimen of 7.5 mg every Mon, Fri; 5 mg all other days. On the week of 2/18, take 5 mg on Monday 2/19 instead 7.5 mg (week of first injection and she needs her INR to be <3).  INR follow up will occur in 2 weeks.  Patient was instructed to maintain consistent vegetable intake, to monitor for any bruising or bleeding, and to call with any medication changes or concerns.    Pt handout given with above information    Ale Yoo, MicheleD

## 2024-02-12 DIAGNOSIS — I82.4Y9 DEEP VEIN THROMBOSIS (DVT) OF PROXIMAL LOWER EXTREMITY, UNSPECIFIED CHRONICITY, UNSPECIFIED LATERALITY (MULTI): Primary | ICD-10-CM

## 2024-02-12 DIAGNOSIS — I26.99 PULMONARY EMBOLISM, UNSPECIFIED CHRONICITY, UNSPECIFIED PULMONARY EMBOLISM TYPE, UNSPECIFIED WHETHER ACUTE COR PULMONALE PRESENT (MULTI): ICD-10-CM

## 2024-02-12 DIAGNOSIS — D68.61 ANTIPHOSPHOLIPID ANTIBODY SYNDROME (MULTI): ICD-10-CM

## 2024-02-14 ENCOUNTER — ANTICOAGULATION - WARFARIN VISIT (OUTPATIENT)
Dept: PHARMACY | Facility: HOSPITAL | Age: 74
End: 2024-02-14
Payer: MEDICARE

## 2024-02-14 DIAGNOSIS — I26.99 PULMONARY EMBOLISM, UNSPECIFIED CHRONICITY, UNSPECIFIED PULMONARY EMBOLISM TYPE, UNSPECIFIED WHETHER ACUTE COR PULMONALE PRESENT (MULTI): ICD-10-CM

## 2024-02-14 DIAGNOSIS — D68.61 ANTIPHOSPHOLIPID ANTIBODY SYNDROME (MULTI): Primary | ICD-10-CM

## 2024-02-14 DIAGNOSIS — I82.4Y9 DEEP VEIN THROMBOSIS (DVT) OF PROXIMAL LOWER EXTREMITY, UNSPECIFIED CHRONICITY, UNSPECIFIED LATERALITY (MULTI): ICD-10-CM

## 2024-02-14 LAB
POC INR: 2.7
POC PROTHROMBIN TIME: NORMAL

## 2024-02-14 PROCEDURE — 99211 OFF/OP EST MAY X REQ PHY/QHP: CPT | Mod: 25 | Performed by: PHARMACIST

## 2024-02-14 PROCEDURE — 85610 PROTHROMBIN TIME: CPT | Mod: QW

## 2024-02-14 NOTE — PROGRESS NOTES
Pt enrolled in [facility] for management of Antiphospholipid antibody syndrome (CMS/HCC) [D68.61].     Pt current location in clinic.     Weeks since last visit: 1    Last INR: 3.2 on warfarin 40 mg in the previous week. Pt was instructed to take 2.5mg that day, then resume usual dose at last visit.    Current INR: 2.7 is therapeutic for goal range of 2.0-3.0 and is reflective of 37.5 mg in the previous week prior to visit.    Has not scheduled scope yet, but will get knee injections. She notes eating more salads in the last week, but this will continue.    Patient reports no missed doses  Patient denies any medication changes, or OTC/herbal supplement changes since last visit.  Patient denies any CP/SOB, fatigue, bleeding or bruising since last visit.   Patient denies any change in alcohol or tobacco use since last visit.   Patient denies any upcoming medical or dental procedures.    Plan:  Patient was instructed to continue with current warfarin dose  INR follow up will occur in 4 weeks.  Patient was instructed to maintain consistent vegetable intake, to monitor for any bruising or bleeding, and to call with any medication changes or concerns.    Pt handout given with above information    Vick Tabares, PharmD

## 2024-02-15 ENCOUNTER — OFFICE VISIT (OUTPATIENT)
Dept: ORTHOPEDIC SURGERY | Facility: CLINIC | Age: 74
End: 2024-02-15
Payer: MEDICARE

## 2024-02-15 VITALS — WEIGHT: 195 LBS | HEIGHT: 64 IN | BODY MASS INDEX: 33.29 KG/M2

## 2024-02-15 DIAGNOSIS — M17.11 ARTHRITIS OF RIGHT KNEE: ICD-10-CM

## 2024-02-15 DIAGNOSIS — M76.31 ILIOTIBIAL BAND SYNDROME OF RIGHT SIDE: Primary | ICD-10-CM

## 2024-02-15 PROCEDURE — 1159F MED LIST DOCD IN RCRD: CPT | Performed by: NURSE PRACTITIONER

## 2024-02-15 PROCEDURE — 1126F AMNT PAIN NOTED NONE PRSNT: CPT | Performed by: NURSE PRACTITIONER

## 2024-02-15 PROCEDURE — 99213 OFFICE O/P EST LOW 20 MIN: CPT | Performed by: NURSE PRACTITIONER

## 2024-02-15 PROCEDURE — 1036F TOBACCO NON-USER: CPT | Performed by: NURSE PRACTITIONER

## 2024-02-15 ASSESSMENT — ENCOUNTER SYMPTOMS
CONSTITUTIONAL NEGATIVE: 1
RESPIRATORY NEGATIVE: 1
ARTHRALGIAS: 1
HEMATOLOGIC/LYMPHATIC NEGATIVE: 1
CARDIOVASCULAR NEGATIVE: 1
KNEE SWELLING: 1
PSYCHIATRIC NEGATIVE: 1
NEUROLOGICAL NEGATIVE: 1
ENDOCRINE NEGATIVE: 1

## 2024-02-15 ASSESSMENT — PAIN DESCRIPTION - DESCRIPTORS: DESCRIPTORS: DULL;ACHING

## 2024-02-15 ASSESSMENT — PAIN - FUNCTIONAL ASSESSMENT: PAIN_FUNCTIONAL_ASSESSMENT: 0-10

## 2024-02-15 ASSESSMENT — PAIN SCALES - GENERAL: PAINLEVEL_OUTOF10: 8

## 2024-02-15 NOTE — ASSESSMENT & PLAN NOTE
We reviewed conservative measures for knee OA symptom control.  Patient to continue to take Tylenol per package directions, diclofenac gel up to 4 times daily.  We discussed cortisone versus viscosupplementation injections.  Patient got extended relief with Visco in the past.  We are requesting Euflexxa from the insurance and plan for patient to schedule once gel is approved.  Regarding the IT band syndrome, we reviewed stretching, use of topical diclofenac gel, self massage.  Patient has also had deep tissue massage in the past and recommended following up with her therapist if it continues to be aggravated.  We also discussed possibility of a greater trochanteric bursal injection if symptoms remain flared while we are seeing her for the knee injections.  Patient is in agreement with plan of care.  This note was generated using Dragon software. It may contain errors in wording, punctuation or spelling.

## 2024-02-15 NOTE — PROGRESS NOTES
Subjective    Patient ID: Marine Luna is a 73 y.o. female.    Chief Complaint: Pain of the Right Knee    Right Knee       Mraine is a pleasant 73-year-old female presenting today for 3-month for R knee pain, OA flare.  Patient with good symptom relief with as needed Tylenol and topical diclofenac gel. Increased stiffness and pain, no inew injuries or falls  Voltaren topical helps  Gel inj in past (Euflexxa Feb 2020 or 2021), lasted longer than cortisone.  Patient noticed flare with increased activity and weather change.  Denies any new injury or falls.      Review of Systems   Constitutional: Negative.    HENT: Negative.     Respiratory: Negative.     Cardiovascular: Negative.    Endocrine: Negative.    Musculoskeletal:  Positive for arthralgias.   Skin: Negative.    Neurological: Negative.    Hematological: Negative.    Psychiatric/Behavioral: Negative.         Objective   Right Knee Exam     Tenderness   The patient is experiencing tenderness in the medial joint line.    Range of Motion   Extension:  0   Flexion:  120     Tests   Malika:  Medial - negative Lateral - negative  Varus: negative Valgus: negative  Lachman:  Anterior - negative      Drawer:  Anterior - negative    Posterior - negative    Other   Sensation: normal  Pulse: present  Swelling: mild    Comments:  Mild edema to medial aspect of the knee .   Full ROM of distal joints with no sx aggravation distal motor and sensory intact, cap refill at 2 seconds.  Mild aggravation of medial knee discomfort with testing, no laxity noted.          Image Results:    4/20/2023 L knee  FINDINGS:  November 16, 2020 Advanced osteoarthritis right knee mostly medial  compartment. No fracture seen. No osseous lesions.     IMPRESSION:  Advanced osteoarthritis right knee particularly in the medial  compartment.      Lab Results   Component Value Date    INR 2.70 02/14/2024    INR 3.20 02/07/2024    INR 2.70 01/03/2024    PROTIME 27.3 (H) 09/05/2023         Assessment/Plan   Encounter Diagnoses:  Problem List Items Addressed This Visit             ICD-10-CM    Arthritis of right knee M17.11     We reviewed conservative measures for knee OA symptom control.  Patient to continue to take Tylenol per package directions, diclofenac gel up to 4 times daily.  We discussed cortisone versus viscosupplementation injections.  Patient got extended relief with Visco in the past.  We are requesting Euflexxa from the insurance and plan for patient to schedule once gel is approved.  Regarding the IT band syndrome, we reviewed stretching, use of topical diclofenac gel, self massage.  Patient has also had deep tissue massage in the past and recommended following up with her therapist if it continues to be aggravated.  We also discussed possibility of a greater trochanteric bursal injection if symptoms remain flared while we are seeing her for the knee injections.  Patient is in agreement with plan of care.  This note was generated using Dragon software. It may contain errors in wording, punctuation or spelling.            Relevant Medications    sodium hyaluronate (Hyalgan) injection 20 mg (Start on 2/16/2024  9:00 AM)    Other Relevant Orders    Follow Up In Orthopaedic Surgery    Iliotibial band syndrome of right side - Primary M76.31

## 2024-02-19 DIAGNOSIS — G25.81 RLS (RESTLESS LEGS SYNDROME): ICD-10-CM

## 2024-02-21 ENCOUNTER — APPOINTMENT (OUTPATIENT)
Dept: PHARMACY | Facility: HOSPITAL | Age: 74
End: 2024-02-21
Payer: MEDICARE

## 2024-02-21 ENCOUNTER — OFFICE VISIT (OUTPATIENT)
Dept: ORTHOPEDIC SURGERY | Facility: CLINIC | Age: 74
End: 2024-02-21
Payer: MEDICARE

## 2024-02-21 VITALS — BODY MASS INDEX: 34.23 KG/M2 | WEIGHT: 199.4 LBS

## 2024-02-21 DIAGNOSIS — M17.11 ARTHRITIS OF RIGHT KNEE: ICD-10-CM

## 2024-02-21 DIAGNOSIS — M76.31 ILIOTIBIAL BAND SYNDROME OF RIGHT SIDE: Primary | ICD-10-CM

## 2024-02-21 PROCEDURE — 1159F MED LIST DOCD IN RCRD: CPT | Performed by: NURSE PRACTITIONER

## 2024-02-21 PROCEDURE — 20611 DRAIN/INJ JOINT/BURSA W/US: CPT | Performed by: NURSE PRACTITIONER

## 2024-02-21 PROCEDURE — 99213 OFFICE O/P EST LOW 20 MIN: CPT | Performed by: NURSE PRACTITIONER

## 2024-02-21 PROCEDURE — 1036F TOBACCO NON-USER: CPT | Performed by: NURSE PRACTITIONER

## 2024-02-21 PROCEDURE — 1126F AMNT PAIN NOTED NONE PRSNT: CPT | Performed by: NURSE PRACTITIONER

## 2024-02-21 RX ORDER — CARBIDOPA AND LEVODOPA 25; 250 MG/1; MG/1
1 TABLET ORAL 3 TIMES DAILY
Qty: 270 TABLET | Refills: 3 | Status: SHIPPED | OUTPATIENT
Start: 2024-02-21

## 2024-02-21 ASSESSMENT — ENCOUNTER SYMPTOMS
RESPIRATORY NEGATIVE: 1
CONSTITUTIONAL NEGATIVE: 1
NEUROLOGICAL NEGATIVE: 1
HEMATOLOGIC/LYMPHATIC NEGATIVE: 1
CARDIOVASCULAR NEGATIVE: 1
ENDOCRINE NEGATIVE: 1
ARTHRALGIAS: 1
PSYCHIATRIC NEGATIVE: 1
KNEE SWELLING: 1

## 2024-02-21 ASSESSMENT — PAIN DESCRIPTION - DESCRIPTORS: DESCRIPTORS: ACHING;SHOOTING

## 2024-02-21 ASSESSMENT — PAIN - FUNCTIONAL ASSESSMENT: PAIN_FUNCTIONAL_ASSESSMENT: 0-10

## 2024-02-21 ASSESSMENT — PAIN SCALES - GENERAL: PAINLEVEL_OUTOF10: 8

## 2024-02-21 NOTE — ASSESSMENT & PLAN NOTE
We reviewed conservative measures for hip OA and greater trochanteric bursitis symptom control.  Patient to continue to take Tylenol per package directions, diclofenac gel up to 4 times daily.   Reviewed rest and elevation.  Home exercises for IT band/greater trochanteric bursitis were reviewed and patient to begin in approximately 1 week and advance as tolerated.  Plan will be to follow-up here in approximately 4-6 weeks, sooner for changes or concerns.  Patient and family in agreement with plan of care.  This note was generated using Dragon software.  It may contain errors in wording, punctuation or spelling.

## 2024-02-21 NOTE — PROGRESS NOTES
Subjective    Patient ID: Marine Luna is a 73 y.o. female.    Chief Complaint: Injections of the Right Knee (Pt is here for her 1st of 3 Euflexxa injections into her right knee.)    Right Knee       Marine is a pleasant 73-year-old female presenting today for approved viscosupplementation injection for R knee pain, OA flare.  Patient with good symptom relief with as needed Tylenol and topical diclofenac gel. Increased stiffness and pain, no inew injuries or falls  Good effect with Visco supplementation in the past.  Patient also continues to have discomfort the right greater trochanter and tightness over the IT band.  Patient is requesting cortisone injection today.      Review of Systems   Constitutional: Negative.    HENT: Negative.     Respiratory: Negative.     Cardiovascular: Negative.    Endocrine: Negative.    Musculoskeletal:  Positive for arthralgias.   Skin: Negative.    Neurological: Negative.    Hematological: Negative.    Psychiatric/Behavioral: Negative.         Objective   Right Knee Exam     Tenderness   The patient is experiencing tenderness in the medial joint line.    Range of Motion   Extension:  0   Flexion:  120     Tests   Malika:  Medial - negative Lateral - negative  Varus: negative Valgus: negative  Lachman:  Anterior - negative      Drawer:  Anterior - negative    Posterior - negative    Other   Sensation: normal  Pulse: present  Swelling: mild    Comments:  Mild edema to medial aspect of the knee .   Full ROM of distal joints with no sx aggravation distal motor and sensory intact, cap refill at 2 seconds.  Mild aggravation of medial knee discomfort with testing, no laxity noted.      Right Hip Exam     Tenderness   The patient is experiencing tenderness in the greater trochanter.    Range of Motion   Abduction:  40   Adduction:  20   External rotation:  60   Internal rotation:  20     Tests   JESS: negative    Other   Erythema: absent  Sensation: normal  Pulse:  present    Comments:  Full ROM of distal joints with no sx aggravation,distal motor and sensory intact, cap refill at 2 seconds.          Image Results:    4/20/2023 L knee  FINDINGS:  November 16, 2020 Advanced osteoarthritis right knee mostly medial  compartment. No fracture seen. No osseous lesions.     IMPRESSION:  Advanced osteoarthritis right knee particularly in the medial  compartment.      Lab Results   Component Value Date    INR 2.70 02/14/2024    INR 3.20 02/07/2024    INR 2.70 01/03/2024    PROTIME 27.3 (H) 09/05/2023    Patient ID: Marine Luna is a 73 y.o. female.    L Inj/Asp: R knee on 2/21/2024 11:09 AM  Indications: pain and joint swelling  Details: 22 G needle, ultrasound-guided superolateral approach  Medications: 20 mg sodium hyaluronate 10 mg/mL(mw 2.4 -3.6 million)  Outcome: tolerated well, no immediate complications    Patient wishes to proceed via verbal consent.  Skin was prepped with Betadine, Vapocoolant spray and alcohol.  Direct visualization using high-frequency linear probe of ultrasound was utilized to administer the injection of Euflexxa #1/2.   Images were saved under MRN number into the PACS system.   Bandaid to site post injection, no bleeding.      Procedure, treatment alternatives, risks and benefits explained, specific risks discussed. Consent was given by the patient. Immediately prior to procedure a time out was called to verify the correct patient, procedure, equipment, support staff and site/side marked as required. Patient was prepped and draped in the usual sterile fashion.           Assessment/Plan   Encounter Diagnoses:  Problem List Items Addressed This Visit             ICD-10-CM    Arthritis of right knee M17.11    Relevant Orders    Point of Care Ultrasound (Completed)     Problem List Items Addressed This Visit             ICD-10-CM    Arthritis of right knee M17.11     We reviewed conservative measures for knee OA symptom control.  Patient to continue to  take Tylenol per package directions, diclofenac gel up to 4 times daily.  Patient to perform light activity today, gradually resume normal activities tomorrow and increase as tolerated.  Plan will be to follow-up in 1 week for injection #2 of 3 of the Euflexxa series.  Patient is in agreement with plan of care.  This note was generated using Dragon software. It may contain errors in wording, punctuation or spelling.            Relevant Orders    Point of Care Ultrasound (Completed)    Follow Up In Orthopaedic Surgery    Iliotibial band syndrome of right side - Primary M76.31     We reviewed conservative measures for hip OA and greater trochanteric bursitis symptom control.  Patient to continue to take Tylenol per package directions, diclofenac gel up to 4 times daily.   Reviewed rest and elevation.  Home exercises for IT band/greater trochanteric bursitis were reviewed and patient to begin in approximately 1 week and advance as tolerated.  Plan will be to follow-up here in approximately 4-6 weeks, sooner for changes or concerns.  Patient and family in agreement with plan of care.  This note was generated using Dragon software.  It may contain errors in wording, punctuation or spelling.

## 2024-02-21 NOTE — ASSESSMENT & PLAN NOTE
We reviewed conservative measures for knee OA symptom control.  Patient to continue to take Tylenol per package directions, diclofenac gel up to 4 times daily.  Patient to perform light activity today, gradually resume normal activities tomorrow and increase as tolerated.  Plan will be to follow-up in 1 week for injection #2 of 3 of the Euflexxa series.  Patient is in agreement with plan of care.  This note was generated using Dragon software. It may contain errors in wording, punctuation or spelling.

## 2024-02-28 ENCOUNTER — OFFICE VISIT (OUTPATIENT)
Dept: ORTHOPEDIC SURGERY | Facility: CLINIC | Age: 74
End: 2024-02-28
Payer: MEDICARE

## 2024-02-28 VITALS — WEIGHT: 195.4 LBS | BODY MASS INDEX: 33.54 KG/M2

## 2024-02-28 DIAGNOSIS — M17.11 ARTHRITIS OF RIGHT KNEE: ICD-10-CM

## 2024-02-28 PROCEDURE — 1159F MED LIST DOCD IN RCRD: CPT | Performed by: NURSE PRACTITIONER

## 2024-02-28 PROCEDURE — 1036F TOBACCO NON-USER: CPT | Performed by: NURSE PRACTITIONER

## 2024-02-28 PROCEDURE — 20611 DRAIN/INJ JOINT/BURSA W/US: CPT | Performed by: NURSE PRACTITIONER

## 2024-02-28 PROCEDURE — 1126F AMNT PAIN NOTED NONE PRSNT: CPT | Performed by: NURSE PRACTITIONER

## 2024-02-28 ASSESSMENT — ENCOUNTER SYMPTOMS
HEMATOLOGIC/LYMPHATIC NEGATIVE: 1
PSYCHIATRIC NEGATIVE: 1
CONSTITUTIONAL NEGATIVE: 1
ARTHRALGIAS: 1
KNEE SWELLING: 1
CARDIOVASCULAR NEGATIVE: 1
NEUROLOGICAL NEGATIVE: 1
RESPIRATORY NEGATIVE: 1
ENDOCRINE NEGATIVE: 1

## 2024-02-28 ASSESSMENT — PAIN DESCRIPTION - DESCRIPTORS: DESCRIPTORS: SHARP;ACHING

## 2024-02-28 ASSESSMENT — PAIN SCALES - GENERAL: PAINLEVEL_OUTOF10: 5 - MODERATE PAIN

## 2024-02-28 NOTE — PROGRESS NOTES
Subjective    Patient ID: Marine Luna is a 73 y.o. female.    Chief Complaint: Injections of the Right Knee (Patient is here for her #2 of 3 Euflexxa injections into her right knee.)    Right Knee       Marine is a pleasant 73-year-old female presenting today Euflexxa No. 2 of 3 viscosupplementation injection for R knee pain, OA flare.  Patient with good symptom relief with as needed Tylenol and topical diclofenac gel. No inew injuries or falls  Sx improvement after first inj, hip pain almost gone  Good effect with Visco supplementation in the past.      Review of Systems   Constitutional: Negative.    HENT: Negative.     Respiratory: Negative.     Cardiovascular: Negative.    Endocrine: Negative.    Musculoskeletal:  Positive for arthralgias.   Skin: Negative.    Neurological: Negative.    Hematological: Negative.    Psychiatric/Behavioral: Negative.         Objective     Image Results:    4/20/2023 L knee  FINDINGS:  November 16, 2020 Advanced osteoarthritis right knee mostly medial  compartment. No fracture seen. No osseous lesions.     IMPRESSION:  Advanced osteoarthritis right knee particularly in the medial  compartment.      Lab Results   Component Value Date    INR 2.70 02/14/2024    INR 3.20 02/07/2024    INR 2.70 01/03/2024    PROTIME 27.3 (H) 09/05/2023    Patient ID: Marine Luna is a 73 y.o. female.    L Inj/Asp: R knee on 2/28/2024 10:38 AM  Indications: pain and joint swelling  Details: 22 G needle, ultrasound-guided superolateral approach  Medications: 20 mg sodium hyaluronate 10 mg/mL(mw 2.4 -3.6 million)  Outcome: tolerated well, no immediate complications    Patient wishes to proceed via verbal consent.  Skin was prepped with Betadine, Vapocoolant spray and alcohol.  Direct visualization using high-frequency linear probe of ultrasound was utilized to administer the injection of Euflexxa #2.   Images were saved under MRN number into the PACS system.   Bandaid to site post injection,  no bleeding.      Procedure, treatment alternatives, risks and benefits explained, specific risks discussed. Consent was given by the patient. Immediately prior to procedure a time out was called to verify the correct patient, procedure, equipment, support staff and site/side marked as required. Patient was prepped and draped in the usual sterile fashion.           Assessment/Plan   Encounter Diagnoses:  Problem List Items Addressed This Visit             ICD-10-CM    Arthritis of right knee M17.11    Relevant Orders    Point of Care Ultrasound (Completed)     Problem List Items Addressed This Visit             ICD-10-CM    Arthritis of right knee M17.11    Relevant Orders    Point of Care Ultrasound (Completed)     Problem List Items Addressed This Visit             ICD-10-CM    Arthritis of right knee M17.11     We reviewed conservative measures for knee OA symptom control.  Patient to continue to take Tylenol per package directions, diclofenac gel up to 4 times daily.  Patient to perform light activity today, gradually resume normal activities tomorrow and increase as tolerated.  Plan will be to follow-up in 1 week for injection #3 of 3 of the Euflexxa series.  Patient is in agreement with plan of care.  This note was generated using Dragon software. It may contain errors in wording, punctuation or spelling.            Relevant Orders    Point of Care Ultrasound (Completed)    Follow Up In Orthopaedic Surgery

## 2024-02-28 NOTE — ASSESSMENT & PLAN NOTE
We reviewed conservative measures for knee OA symptom control.  Patient to continue to take Tylenol per package directions, diclofenac gel up to 4 times daily.  Patient to perform light activity today, gradually resume normal activities tomorrow and increase as tolerated.  Plan will be to follow-up in 1 week for injection #3 of 3 of the Euflexxa series.  Patient is in agreement with plan of care.  This note was generated using Dragon software. It may contain errors in wording, punctuation or spelling.

## 2024-03-06 ENCOUNTER — OFFICE VISIT (OUTPATIENT)
Dept: ORTHOPEDIC SURGERY | Facility: CLINIC | Age: 74
End: 2024-03-06
Payer: MEDICARE

## 2024-03-06 DIAGNOSIS — M17.11 ARTHRITIS OF RIGHT KNEE: ICD-10-CM

## 2024-03-06 PROCEDURE — 1159F MED LIST DOCD IN RCRD: CPT | Performed by: NURSE PRACTITIONER

## 2024-03-06 PROCEDURE — 1126F AMNT PAIN NOTED NONE PRSNT: CPT | Performed by: NURSE PRACTITIONER

## 2024-03-06 PROCEDURE — 1036F TOBACCO NON-USER: CPT | Performed by: NURSE PRACTITIONER

## 2024-03-06 PROCEDURE — 20611 DRAIN/INJ JOINT/BURSA W/US: CPT | Performed by: NURSE PRACTITIONER

## 2024-03-06 ASSESSMENT — ENCOUNTER SYMPTOMS
CONSTITUTIONAL NEGATIVE: 1
CARDIOVASCULAR NEGATIVE: 1
ARTHRALGIAS: 1
RESPIRATORY NEGATIVE: 1
ENDOCRINE NEGATIVE: 1
PSYCHIATRIC NEGATIVE: 1
KNEE SWELLING: 1
HEMATOLOGIC/LYMPHATIC NEGATIVE: 1
NEUROLOGICAL NEGATIVE: 1

## 2024-03-06 ASSESSMENT — PAIN DESCRIPTION - DESCRIPTORS: DESCRIPTORS: ACHING

## 2024-03-06 ASSESSMENT — PAIN SCALES - GENERAL: PAINLEVEL_OUTOF10: 4

## 2024-03-06 NOTE — PROGRESS NOTES
Subjective    Patient ID: Marine Luna is a 73 y.o. female.    Chief Complaint: No chief complaint on file.    Right Knee       Marine is a pleasant 73-year-old female presenting today Euflexxa No. 3 of 3 viscosupplementation injection for R knee pain, OA flare.  Patient with good symptom relief with as needed Tylenol and topical diclofenac gel. No inew injuries or falls  Sx improvement after first and second inj, hip pain gone      Review of Systems   Constitutional: Negative.    HENT: Negative.     Respiratory: Negative.     Cardiovascular: Negative.    Endocrine: Negative.    Musculoskeletal:  Positive for arthralgias.   Skin: Negative.    Neurological: Negative.    Hematological: Negative.    Psychiatric/Behavioral: Negative.         Objective   Knee Musculoskeletal Exam    Inspection    Right      Alignment: varus        Alignment comment: mild    Palpation    Right      Right knee palpation is unremarkable.      Range of Motion    Right      Active extension: 0      Active flexion: 120     Instability    Right      Instability signs: none - stable    Image Results:    4/20/2023 L knee  FINDINGS:  November 16, 2020 Advanced osteoarthritis right knee mostly medial  compartment. No fracture seen. No osseous lesions.     IMPRESSION:  Advanced osteoarthritis right knee particularly in the medial  compartment.      Lab Results   Component Value Date    INR 2.70 02/14/2024    INR 3.20 02/07/2024    INR 2.70 01/03/2024    PROTIME 27.3 (H) 09/05/2023    Patient ID: Marine Luna is a 73 y.o. female.    L Inj/Asp: R knee on 3/6/2024 7:58 AM  Indications: pain and joint swelling  Details: 22 G needle, ultrasound-guided superolateral approach  Medications: 20 mg sodium hyaluronate 10 mg/mL(mw 2.4 -3.6 million)  Outcome: tolerated well, no immediate complications    Patient wishes to proceed via verbal consent.  Skin was prepped with Betadine, Vapocoolant spray and alcohol.  Direct visualization using  high-frequency linear probe of ultrasound was utilized to administer the injection of Euflexxa #3/3.   Images were saved under MRN number into the PACS system.   Bandaid to site post injection, no bleeding.      Procedure, treatment alternatives, risks and benefits explained, specific risks discussed. Consent was given by the patient. Immediately prior to procedure a time out was called to verify the correct patient, procedure, equipment, support staff and site/side marked as required. Patient was prepped and draped in the usual sterile fashion.           Assessment/Plan   Encounter Diagnoses:    Problem List Items Addressed This Visit             ICD-10-CM    Arthritis of right knee M17.11     We reviewed conservative measures for knee OA symptom control.  Patient to continue to take Tylenol per package directions, diclofenac gel up to 4 times daily.  Patient to perform light activity today, gradually resume normal activities tomorrow and increase as tolerated.  Plan will be to follow-up in approximately 3 months, sooner for changes or concerns.  Patient is in agreement with plan of care.  This note was generated using Dragon software. It may contain errors in wording, punctuation or spelling.            Relevant Orders    Point of Care Ultrasound (Completed)    Follow Up In Orthopaedic Surgery

## 2024-03-06 NOTE — ASSESSMENT & PLAN NOTE
We reviewed conservative measures for knee OA symptom control.  Patient to continue to take Tylenol per package directions, diclofenac gel up to 4 times daily.  Patient to perform light activity today, gradually resume normal activities tomorrow and increase as tolerated.  Plan will be to follow-up in approximately 3 months, sooner for changes or concerns.  Patient is in agreement with plan of care.  This note was generated using Dragon software. It may contain errors in wording, punctuation or spelling.

## 2024-03-13 ENCOUNTER — ANTICOAGULATION - WARFARIN VISIT (OUTPATIENT)
Dept: PHARMACY | Facility: HOSPITAL | Age: 74
End: 2024-03-13
Payer: MEDICARE

## 2024-03-13 DIAGNOSIS — I82.4Y9 DEEP VEIN THROMBOSIS (DVT) OF PROXIMAL LOWER EXTREMITY, UNSPECIFIED CHRONICITY, UNSPECIFIED LATERALITY (MULTI): ICD-10-CM

## 2024-03-13 DIAGNOSIS — D68.61 ANTIPHOSPHOLIPID ANTIBODY SYNDROME (MULTI): Primary | ICD-10-CM

## 2024-03-13 DIAGNOSIS — I26.99 PULMONARY EMBOLISM, UNSPECIFIED CHRONICITY, UNSPECIFIED PULMONARY EMBOLISM TYPE, UNSPECIFIED WHETHER ACUTE COR PULMONALE PRESENT (MULTI): ICD-10-CM

## 2024-03-13 LAB
POC INR: 4.3
POC PROTHROMBIN TIME: NORMAL

## 2024-03-13 PROCEDURE — 85610 PROTHROMBIN TIME: CPT | Mod: QW

## 2024-03-13 NOTE — PROGRESS NOTES
Pt enrolled in [facility] for management of Antiphospholipid antibody syndrome (CMS/HCC) [D68.61].     Pt current location in clinic.     Current anticoagulant: Warfarin    Weeks since last visit: 4    Last INR: 2.7 on warfarin 40 mg in the previous week. No changes were made at that time.  Last Creatinine:   Lab Results   Component Value Date    CREATININE 1.15 (H) 12/13/2023     Last hemoglobin/hematocrit:  Lab Results   Component Value Date    HGB 13.2 12/13/2023     Lab Results   Component Value Date    HCT 42.7 12/13/2023       Current INR: 4.3 is SUPRAtherapeutic for goal range of 2.0-3.0 and is reflective of 40 mg in the previous week prior to visit.    Patient denies any missed doses.  Patient has been eating more salads recently at dinner times.   Patient denies any adverse reactions or barriers  Patient note more bruising than usual around her watchband, and on the outside of her right calf.   Patient denies any change in alcohol or tobacco use since last visit.   Patient denies any upcoming medical or dental procedures.    Pt states she's had silicon shots in her knee every Wednesday for the past 3 weeks. She has done this previously with no issues.     Plan:  Patient was instructed to  hold warfarin today, take 5mg thu,fri, then resume current dosing  INR follow up will occur in 1 weeks.  Patient was instructed to maintain consistent vegetable intake, to monitor for any bruising or bleeding, and to call with any medication changes or concerns.    Pt handout given with above information    Michele SifuentesD

## 2024-03-20 ENCOUNTER — ANTICOAGULATION - WARFARIN VISIT (OUTPATIENT)
Dept: PHARMACY | Facility: HOSPITAL | Age: 74
End: 2024-03-20
Payer: MEDICARE

## 2024-03-20 DIAGNOSIS — I82.4Y9 DEEP VEIN THROMBOSIS (DVT) OF PROXIMAL LOWER EXTREMITY, UNSPECIFIED CHRONICITY, UNSPECIFIED LATERALITY (MULTI): ICD-10-CM

## 2024-03-20 DIAGNOSIS — D68.61 ANTIPHOSPHOLIPID ANTIBODY SYNDROME (MULTI): ICD-10-CM

## 2024-03-20 DIAGNOSIS — I26.99 PULMONARY EMBOLISM, UNSPECIFIED CHRONICITY, UNSPECIFIED PULMONARY EMBOLISM TYPE, UNSPECIFIED WHETHER ACUTE COR PULMONALE PRESENT (MULTI): ICD-10-CM

## 2024-03-20 LAB
INR IN PPP BY COAGULATION ASSAY EXTERNAL: 4.5
PROTHROMBIN TIME (PT) IN PPP BY COAGULATION ASSAY EXTERNAL: NORMAL SECONDS

## 2024-03-20 PROCEDURE — 99211 OFF/OP EST MAY X REQ PHY/QHP: CPT | Performed by: PHARMACIST

## 2024-03-20 NOTE — PROGRESS NOTES
Pt enrolled in [facility] for management of D68.61     Pt current location in clinic.     Current anticoagulant: Warfarin    Time since last visit: 1 weeks    Last INR: 4.3 on warfarin 40 mg in the previous week. Pt was instructed to hold warfarin that day, take 5mg that Friday, but resume usual dosing otherwise at last visit.    Last Creatinine:   Lab Results   Component Value Date    CREATININE 1.15 (H) 12/13/2023     Last hemoglobin/hematocrit:  Lab Results   Component Value Date    HGB 13.2 12/13/2023     Lab Results   Component Value Date    HCT 42.7 12/13/2023       Current INR: 4.5 is SUPRAtherapeutic for goal range of 2.0-3.0 and is reflective of 35 mg in the previous week prior to visit.    Pt has been feeling lousy lately. She might have a stomach bug, started yesterday. She is still eating ok.    Patient denies any missed doses.  Patient started taking zetia two months ago, Dr. Gates prescribed it October 2023, but she did not start it until two months ago.  Pt also has decreased salad intake recently, but was eating them when she was still high last week.   Patient denies any adverse reactions or barriers.  Patient denies any CP/SOB, fatigue, bleeding or bruising since last visit.   Patient denies any change in alcohol or tobacco use since last visit.   Patient denies any upcoming medical or dental procedures.        Plan:  Patient was instructed to  hold warfarin x2, then start 35mg weekly .  INR follow up will occur in 1 weeks.  Patient was instructed to maintain consistent vegetable intake, to monitor for any bruising or bleeding, and to call with any medication changes or concerns.    Pt handout given with above information    FANNY WAITE    I was present with the pharmacy student who participated in the documentation of this note. I have personally evaluated the patient and performed the decision-making components (assessment and plan of care). I have reviewed the pharmacy student  documentation and verified the findings in the note as written with additions or exceptions as stated in the body of this note.    Vick Tabares, MicheleD

## 2024-03-27 ENCOUNTER — ANTICOAGULATION - WARFARIN VISIT (OUTPATIENT)
Dept: PHARMACY | Facility: HOSPITAL | Age: 74
End: 2024-03-27
Payer: MEDICARE

## 2024-03-27 DIAGNOSIS — D68.61 ANTIPHOSPHOLIPID ANTIBODY SYNDROME (MULTI): ICD-10-CM

## 2024-03-27 DIAGNOSIS — I82.4Y9 DEEP VEIN THROMBOSIS (DVT) OF PROXIMAL LOWER EXTREMITY, UNSPECIFIED CHRONICITY, UNSPECIFIED LATERALITY (MULTI): ICD-10-CM

## 2024-03-27 DIAGNOSIS — I26.99 PULMONARY EMBOLISM, UNSPECIFIED CHRONICITY, UNSPECIFIED PULMONARY EMBOLISM TYPE, UNSPECIFIED WHETHER ACUTE COR PULMONALE PRESENT (MULTI): ICD-10-CM

## 2024-03-27 LAB
INR IN PPP BY COAGULATION ASSAY EXTERNAL: 2.3
PROTHROMBIN TIME (PT) IN PPP BY COAGULATION ASSAY EXTERNAL: NORMAL SECONDS

## 2024-03-27 PROCEDURE — 99211 OFF/OP EST MAY X REQ PHY/QHP: CPT | Performed by: PHARMACIST

## 2024-03-27 NOTE — PROGRESS NOTES
Pt enrolled in [facility] for management of D68.61    Pt current location in clinic.     Current anticoagulant: Warfarin    Time since last visit: 1 weeks    Last INR: 4.5 on warfarin 32.5 mg in the previous week. Pt was instructed to hold x2, then start 5mg daily at last visit.    Last Creatinine:   Lab Results   Component Value Date    CREATININE 1.15 (H) 12/13/2023     Last hemoglobin/hematocrit:  Lab Results   Component Value Date    HGB 13.2 12/13/2023     Lab Results   Component Value Date    HCT 42.7 12/13/2023       Current INR: 2.3 is therapeutic for goal range of 2.0-3.0 and is reflective of 25 mg in the previous week prior to visit.    Patient denies any missed doses.  Patient denies any medication changes, diet changes, or OTC/herbal supplement changes since last visit.  Patient denies any adverse reactions or barriers.  Patient denies any CP/SOB, fatigue, or bruising since last visit.   Patient denies any change in alcohol or tobacco use since last visit.   Patient denies any upcoming medical or dental procedures.    Pt noted blood in her urine on 3/20 and 3/21, that spontaneously resolved by 3/22. She has not noticed anymore since then. She did not have any pain, frequency, or urgency symptoms during this time.    Plan:  Patient was instructed to continue with current warfarin dose.  INR follow up will occur in 2 weeks.  Patient was instructed to maintain consistent vegetable intake, to monitor for any bruising or bleeding, and to call with any medication changes or concerns.    Pt handout given with above information    FANNY WAITE

## 2024-04-09 ENCOUNTER — TELEPHONE (OUTPATIENT)
Dept: GASTROENTEROLOGY | Facility: CLINIC | Age: 74
End: 2024-04-09
Payer: MEDICARE

## 2024-04-09 DIAGNOSIS — R19.5 POSITIVE COLORECTAL CANCER SCREENING USING COLOGUARD TEST: ICD-10-CM

## 2024-04-09 NOTE — TELEPHONE ENCOUNTER
Called patient to let her know. Left info on her voicemail and requested she call back to verify she received message and understood  ----- Message from Vick Tabares PharmD sent at 4/9/2024 10:45 AM EDT -----  3 days should be good  ----- Message -----  From: Kathy Harrison MA  Sent: 4/9/2024   9:03 AM EDT  To: Vick Tabares PharmD    [facility] hold for colonoscopy? How many days?

## 2024-04-10 ENCOUNTER — ANTICOAGULATION - WARFARIN VISIT (OUTPATIENT)
Dept: PHARMACY | Facility: HOSPITAL | Age: 74
End: 2024-04-10
Payer: MEDICARE

## 2024-04-10 DIAGNOSIS — I26.99 PULMONARY EMBOLISM, UNSPECIFIED CHRONICITY, UNSPECIFIED PULMONARY EMBOLISM TYPE, UNSPECIFIED WHETHER ACUTE COR PULMONALE PRESENT (MULTI): ICD-10-CM

## 2024-04-10 DIAGNOSIS — I82.4Y9 DEEP VEIN THROMBOSIS (DVT) OF PROXIMAL LOWER EXTREMITY, UNSPECIFIED CHRONICITY, UNSPECIFIED LATERALITY (MULTI): ICD-10-CM

## 2024-04-10 DIAGNOSIS — D68.61 ANTIPHOSPHOLIPID ANTIBODY SYNDROME (MULTI): Primary | ICD-10-CM

## 2024-04-10 LAB
POC INR: 3.1
POC PROTHROMBIN TIME: NORMAL

## 2024-04-10 PROCEDURE — 99211 OFF/OP EST MAY X REQ PHY/QHP: CPT | Performed by: PHARMACIST

## 2024-04-10 PROCEDURE — 85610 PROTHROMBIN TIME: CPT | Mod: QW

## 2024-04-10 NOTE — PROGRESS NOTES
"Pt enrolled in [facility] for management of Antiphospholipid antibody syndrome (CMS/HCC) [D68.61].     Pt current location in clinic.     Current anticoagulant: Warfarin    Time since last visit: 2 weeks    Last INR: 2.3 on warfarin 25 mg in the previous week. No changes were made at that time.    Last Creatinine:   Lab Results   Component Value Date    CREATININE 1.15 (H) 12/13/2023     Last hemoglobin/hematocrit:  Lab Results   Component Value Date    HGB 13.2 12/13/2023     Lab Results   Component Value Date    HCT 42.7 12/13/2023       Current INR: 3.1 is supratherapeutic for goal range of 2.0-3.0 and is reflective of 35 mg in the previous week prior to visit.    Patient denies any missed doses.  Patient denies any medication changes, diet changes, or OTC/herbal supplement changes since last visit.  Patient denies any adverse reactions or barriers.  Pt notes she's having a colonoscopy with Dr Young on 4/20.  Pt will hold coumadin 3 days prior.  No bridging noted.  Patient denies any CP/SOB, fatigue, bleeding or bruising since last visit.   Patient denies any change in alcohol or tobacco use since last visit.   Pt notes she wants to start a \"hair skin and nails\" supplement.  I ran a drug interaction report and did not notice any issues, however I recommended waiting until shortly before next appt and start taking to analyze any interactions.    Patient denies any upcoming medical or dental procedures.    Plan:  Patient was instructed to  take 2.5 mg x 1 then  .  INR follow up will occur in 2 weeks.  Patient was instructed to maintain consistent vegetable intake, to monitor for any bruising or bleeding, and to call with any medication changes or concerns.    Pt handout given with above information    Taina Zamora, PharmD   "

## 2024-04-24 ENCOUNTER — ANTICOAGULATION - WARFARIN VISIT (OUTPATIENT)
Dept: PHARMACY | Facility: HOSPITAL | Age: 74
End: 2024-04-24
Payer: MEDICARE

## 2024-04-24 DIAGNOSIS — I26.99 PULMONARY EMBOLISM, UNSPECIFIED CHRONICITY, UNSPECIFIED PULMONARY EMBOLISM TYPE, UNSPECIFIED WHETHER ACUTE COR PULMONALE PRESENT (MULTI): ICD-10-CM

## 2024-04-24 DIAGNOSIS — D68.61 ANTIPHOSPHOLIPID ANTIBODY SYNDROME (MULTI): Primary | ICD-10-CM

## 2024-04-24 DIAGNOSIS — I82.4Y9 DEEP VEIN THROMBOSIS (DVT) OF PROXIMAL LOWER EXTREMITY, UNSPECIFIED CHRONICITY, UNSPECIFIED LATERALITY (MULTI): ICD-10-CM

## 2024-04-24 LAB
POC INR: 2.7
POC PROTHROMBIN TIME: NORMAL

## 2024-04-24 PROCEDURE — 99211 OFF/OP EST MAY X REQ PHY/QHP: CPT | Performed by: PHARMACIST

## 2024-04-24 PROCEDURE — 85610 PROTHROMBIN TIME: CPT | Mod: QW

## 2024-04-24 NOTE — PROGRESS NOTES
Pt enrolled in [facility] for management of Antiphospholipid antibody syndrome (Multi) [D68.61].     Pt current location in clinic.     Current anticoagulant: Warfarin    Time since last visit: 2 weeks    Last INR: 3.1 on warfarin 35 mg in the previous week. Pt was instructed to take 2.5mg once, then resume usual dosing at last visit.    Last Creatinine:   Lab Results   Component Value Date    CREATININE 1.15 (H) 12/13/2023     Last hemoglobin/hematocrit:  Lab Results   Component Value Date    HGB 13.2 12/13/2023     Lab Results   Component Value Date    HCT 42.7 12/13/2023       Current INR: 2.7 is therapeutic for goal range of 2.0-3.0 and is reflective of 35 mg in the previous week prior to visit.    Patient denies any missed doses.  Patient denies any medication changes, diet changes, or OTC/herbal supplement changes since last visit.  Patient denies any adverse reactions or barriers.  Patient denies any CP/SOB, fatigue, bleeding or bruising since last visit.   Patient denies any change in alcohol or tobacco use since last visit.   Patient denies any upcoming medical or dental procedures - she is to have colonoscopy 4/30 and will hold for three days with no bridge.    Plan:  Patient was instructed to continue with current warfarin dose with last dose on 4/26, then plan to resume on evening of 4/30 if cleared by Dr. Young. We will see her back then the following day for INR. If less than 1.4, can consider bridging back up with INR a few days later.  INR follow up will occur in 1 weeks.  Patient was instructed to maintain consistent vegetable intake, to monitor for any bruising or bleeding, and to call with any medication changes or concerns.    Pt handout given with above information    Vick Tabares, MicheleD

## 2024-04-30 ENCOUNTER — HOSPITAL ENCOUNTER (OUTPATIENT)
Dept: GASTROENTEROLOGY | Facility: CLINIC | Age: 74
Setting detail: OUTPATIENT SURGERY
Discharge: HOME | End: 2024-04-30
Payer: MEDICARE

## 2024-04-30 VITALS
RESPIRATION RATE: 16 BRPM | SYSTOLIC BLOOD PRESSURE: 134 MMHG | HEART RATE: 68 BPM | TEMPERATURE: 96.6 F | BODY MASS INDEX: 32.91 KG/M2 | DIASTOLIC BLOOD PRESSURE: 86 MMHG | HEIGHT: 64 IN | OXYGEN SATURATION: 97 % | WEIGHT: 192.8 LBS

## 2024-04-30 DIAGNOSIS — R19.5 POSITIVE COLORECTAL CANCER SCREENING USING COLOGUARD TEST: Primary | ICD-10-CM

## 2024-04-30 PROCEDURE — 3700000012 HC SEDATION LEVEL 5+ TIME - INITIAL 15 MINUTES 5/> YEARS

## 2024-04-30 PROCEDURE — G0500 MOD SEDAT ENDO SERVICE >5YRS: HCPCS | Performed by: INTERNAL MEDICINE

## 2024-04-30 PROCEDURE — 88305 TISSUE EXAM BY PATHOLOGIST: CPT | Performed by: STUDENT IN AN ORGANIZED HEALTH CARE EDUCATION/TRAINING PROGRAM

## 2024-04-30 PROCEDURE — 3700000013 HC SEDATION LEVEL 5+ TIME - EACH ADDITIONAL 15 MINUTES

## 2024-04-30 PROCEDURE — 2500000004 HC RX 250 GENERAL PHARMACY W/ HCPCS (ALT 636 FOR OP/ED): Performed by: INTERNAL MEDICINE

## 2024-04-30 PROCEDURE — 7100000009 HC PHASE TWO TIME - INITIAL BASE CHARGE

## 2024-04-30 PROCEDURE — 88305 TISSUE EXAM BY PATHOLOGIST: CPT | Mod: TC,SUR,SAMLAB | Performed by: INTERNAL MEDICINE

## 2024-04-30 PROCEDURE — 45385 COLONOSCOPY W/LESION REMOVAL: CPT | Performed by: INTERNAL MEDICINE

## 2024-04-30 PROCEDURE — 7100000010 HC PHASE TWO TIME - EACH INCREMENTAL 1 MINUTE

## 2024-04-30 RX ORDER — MEPERIDINE HYDROCHLORIDE 25 MG/ML
INJECTION INTRAMUSCULAR; INTRAVENOUS; SUBCUTANEOUS AS NEEDED
Status: COMPLETED | OUTPATIENT
Start: 2024-04-30 | End: 2024-04-30

## 2024-04-30 RX ORDER — MIDAZOLAM HYDROCHLORIDE 5 MG/ML
INJECTION, SOLUTION INTRAMUSCULAR; INTRAVENOUS AS NEEDED
Status: COMPLETED | OUTPATIENT
Start: 2024-04-30 | End: 2024-04-30

## 2024-04-30 RX ORDER — SODIUM CHLORIDE, SODIUM LACTATE, POTASSIUM CHLORIDE, CALCIUM CHLORIDE 600; 310; 30; 20 MG/100ML; MG/100ML; MG/100ML; MG/100ML
20 INJECTION, SOLUTION INTRAVENOUS CONTINUOUS
Status: DISCONTINUED | OUTPATIENT
Start: 2024-04-30 | End: 2024-05-01 | Stop reason: HOSPADM

## 2024-04-30 RX ADMIN — GLUCAGON HYDROCHLORIDE 1 MG: KIT at 12:28

## 2024-04-30 RX ADMIN — MIDAZOLAM HYDROCHLORIDE 1 MG: 5 INJECTION, SOLUTION INTRAMUSCULAR; INTRAVENOUS at 12:35

## 2024-04-30 RX ADMIN — MEPERIDINE HYDROCHLORIDE 25 MG: 25 INJECTION INTRAMUSCULAR; INTRAVENOUS; SUBCUTANEOUS at 12:32

## 2024-04-30 RX ADMIN — MIDAZOLAM HYDROCHLORIDE 2.5 MG: 5 INJECTION, SOLUTION INTRAMUSCULAR; INTRAVENOUS at 12:27

## 2024-04-30 RX ADMIN — MIDAZOLAM HYDROCHLORIDE 2.5 MG: 5 INJECTION, SOLUTION INTRAMUSCULAR; INTRAVENOUS at 12:32

## 2024-04-30 RX ADMIN — MIDAZOLAM HYDROCHLORIDE 1.5 MG: 5 INJECTION, SOLUTION INTRAMUSCULAR; INTRAVENOUS at 12:36

## 2024-04-30 RX ADMIN — SODIUM CHLORIDE, POTASSIUM CHLORIDE, SODIUM LACTATE AND CALCIUM CHLORIDE 20 ML/HR: 600; 310; 30; 20 INJECTION, SOLUTION INTRAVENOUS at 12:10

## 2024-04-30 RX ADMIN — MEPERIDINE HYDROCHLORIDE 25 MG: 25 INJECTION INTRAMUSCULAR; INTRAVENOUS; SUBCUTANEOUS at 12:27

## 2024-04-30 ASSESSMENT — COLUMBIA-SUICIDE SEVERITY RATING SCALE - C-SSRS
1. IN THE PAST MONTH, HAVE YOU WISHED YOU WERE DEAD OR WISHED YOU COULD GO TO SLEEP AND NOT WAKE UP?: NO
6. HAVE YOU EVER DONE ANYTHING, STARTED TO DO ANYTHING, OR PREPARED TO DO ANYTHING TO END YOUR LIFE?: NO
2. HAVE YOU ACTUALLY HAD ANY THOUGHTS OF KILLING YOURSELF?: NO

## 2024-04-30 ASSESSMENT — PAIN - FUNCTIONAL ASSESSMENT
PAIN_FUNCTIONAL_ASSESSMENT: 0-10

## 2024-04-30 ASSESSMENT — PAIN SCALES - GENERAL
PAINLEVEL_OUTOF10: 0 - NO PAIN
PAINLEVEL_OUTOF10: 3
PAINLEVEL_OUTOF10: 0 - NO PAIN

## 2024-04-30 NOTE — H&P
History Of Present Illness  Marine Luna is a 74 y.o. female presenting with + Cologuard, presents for screening colonoscopy.     Past Medical History  Past Medical History:   Diagnosis Date    Blood clot in vein     Chronic kidney disease     Disease of thyroid gland     Hypertension     Seasonal allergies      Surgical History  Past Surgical History:   Procedure Laterality Date    COLONOSCOPY      OTHER SURGICAL HISTORY  03/08/2022    Knee surgery    OTHER SURGICAL HISTORY  03/08/2022    Finger fracture repair     Social History  She reports that she quit smoking about 20 years ago. Her smoking use included cigarettes. She started smoking about 45 years ago. She has a 25 pack-year smoking history. She has been exposed to tobacco smoke. She has never used smokeless tobacco. She reports that she does not currently use alcohol. She reports that she does not use drugs.    Family History  Family History   Problem Relation Name Age of Onset    Hypertension Mother      Other (valvur heart disease) Mother      Hypertension Father      Heart attack Father      Breast cancer Sister          Allergies  Allergies   Allergen Reactions    Statins-Hmg-Coa Reductase Inhibitors Myalgia     Review of Systems  Pre-sedation Evaluation:  ASA Classification - ASA 2 - Patient with mild systemic disease with no functional limitations  Mallampati Score - II (hard and soft palate, upper portion of tonsils and uvula visible)    Physical Exam  Vitals and nursing note reviewed.   Constitutional:       Appearance: Normal appearance.   HENT:      Head: Normocephalic.      Mouth/Throat:      Mouth: Mucous membranes are moist.      Pharynx: Oropharynx is clear.   Eyes:      Conjunctiva/sclera: Conjunctivae normal.      Pupils: Pupils are equal, round, and reactive to light.   Cardiovascular:      Rate and Rhythm: Normal rate and regular rhythm.      Heart sounds: Normal heart sounds.   Pulmonary:      Effort: Pulmonary effort is normal.       Breath sounds: Normal breath sounds.   Abdominal:      General: Abdomen is flat. Bowel sounds are normal.      Palpations: Abdomen is soft.   Musculoskeletal:      Cervical back: Normal range of motion and neck supple.   Skin:     General: Skin is warm and dry.   Neurological:      General: No focal deficit present.      Mental Status: She is alert and oriented to person, place, and time.   Psychiatric:         Behavior: Behavior normal.          Last Recorded Vitals  There were no vitals taken for this visit.     Assessment/Plan   Problem List Items Addressed This Visit    None  Visit Diagnoses       Positive colorectal cancer screening using Cologuard test    -  Primary    Relevant Orders    Colonoscopy Screening; High Risk Patient; positive cologuard               PTA/Current Medications:  (Not in a hospital admission)    Current Outpatient Medications   Medication Sig Dispense Refill    carbidopa-levodopa (Sinemet)  mg tablet TAKE 1 TABLET BY MOUTH 3 TIMES  DAILY 270 tablet 3    carvedilol (Coreg) 6.25 mg tablet Take 0.5 tablets (3.125 mg) by mouth 2 times a day with meals. 90 tablet 3    cilostazol (Pletal) 50 mg tablet Take 1 tablet (50 mg) by mouth 2 times a day. 90 tablet 3    Coreg 3.125 mg tablet Take 1 tablet (3.125 mg) by mouth 2 times a day with meals. 180 tablet 3    Cozaar 25 mg tablet Take 1 tablet (25 mg) by mouth once daily.      aspirin 81 mg EC tablet Take 1 tablet (81 mg) by mouth once daily.      buPROPion SR (Wellbutrin SR) 150 mg 12 hr tablet Take 1 tablet (150 mg) by mouth once daily. 90 tablet 3    calcium carbonate-vitamin D3 600 mg-5 mcg (200 unit) tablet Take 1 tablet by mouth once daily.      cholecalciferol (Vitamin D-3) 25 MCG (1000 UT) capsule Take 1 capsule (25 mcg) by mouth 2 times a day.      coenzyme Q-10 100 mg capsule Take 1 capsule (100 mg) by mouth once daily.      Crestor 5 mg tablet Take 0.5 tablets (2.5 mg) by mouth once daily. 90 tablet 3    docosahexaenoic  acid/epa (FISH OIL ORAL) Take 1 capsule by mouth early in the morning..      ezetimibe (Zetia) 10 mg tablet Take 1 tablet (10 mg) by mouth once daily. 90 tablet 3    fexofenadine HCl (ALLEGRA ORAL) Take 1 tablet by mouth once daily.      FLUoxetine (PROzac) 20 mg capsule Take 1 capsule (20 mg) by mouth once daily. 90 capsule 3    levothyroxine (Synthroid, Levoxyl) 50 mcg tablet Take 1 tablet (50 mcg) by mouth once daily. 90 tablet 3    valACYclovir (Valtrex) 500 mg tablet Take 2 tablets (1,000 mg) by mouth once daily.      warfarin (Coumadin) 5 mg tablet Take 1 and 1/2 tablets (7.5 mg) by mouth daily or as directed by anticoagulation clinic. 135 tablet 3     No current facility-administered medications for this encounter.     Jameson Young DO

## 2024-04-30 NOTE — DISCHARGE INSTRUCTIONS
Patient Instructions after a Colonoscopy      The anesthetics, sedatives or narcotics which were given to you today will be acting in your body for the next 24 hours, so you might feel a little sleepy or groggy.  This feeling should slowly wear off. Carefully read and follow the instructions.     You received sedation today:  - Do not drive or operate any machinery or power tools of any kind.   - No alcoholic beverages today, not even beer or wine.  - Do not make any important decisions or sign any legal documents.  - No over the counter medications that contain alcohol or that may cause drowsiness.  - Do not make any important decisions or sign any legal documents.    While it is common to experience mild to moderate abdominal distention, gas, or belching after your procedure, if any of these symptoms occur following discharge from the GI Lab or within one week of having your procedure, call the [facility] to be advised whether a visit to your nearest Urgent Care or Emergency Department is indicated.  Take this paper with you if you go.     - If you develop an allergic reaction to the medications that were given during your procedure such as difficulty breathing, rash, hives, severe nausea, vomiting or lightheadedness.  - If you experience chest pain, shortness of breath, severe abdominal pain, fevers and chills.  -If you develop signs and symptoms of bleeding such as blood in your spit, if your stools turn black, tarry, or bloody  - If you have not urinated within 8 hours following your procedure.  - If your IV site becomes painful, red, inflamed, or looks infected.    If you received a biopsy/polypectomy/sphincterotomy the following instructions apply below:    __ Do not use Aspirin containing products, non-steroidal medications or anti-coagulants for one week following your procedure. (Examples of these types of medications are: Advil, Arthrotec, Aleve, Coumadin, Ecotrin, Heparin, Ibuprofen,  Indocin, Motrin, Naprosyn, Nuprin, Plavix, Vioxx, and Voltarin, or their generic forms.  This list is not all-inclusive.  Check with your physician or pharmacist before resuming medications.)   __ Eat a soft diet today.  Avoid foods that are poorly digested for the next 24 hours.  These foods would include: nuts, beans, lettuce, red meats, and fried foods. Start with liquids and advance your diet as tolerated, gradually work up to eating solids.   __ Do not have a Barium Study or Enema for one week.    Your physician recommends the additional following instructions:    -You have a contact number available for emergencies. The signs and symptoms of potential delayed complications were discussed with you. You may return to normal activities tomorrow.  -Resume your previous diet.  -Continue your present medications.   -We are waiting for your pathology results.  -Your physician has recommended a repeat colonoscopy (date to be determined after pending pathology results are reviewed) for surveillance based on pathology results.  -The findings and recommendations have been discussed with you.  -The findings and recommendations were discussed with your family.  - Please see Medication Reconciliation Form for new medication/medications prescribed.       If you experience any problems or have any questions following discharge from the GI Lab, please call:        Nurse Signature                                                                        Date___________________                                                                            Patient/Responsible Party Signature                                        Date___________________

## 2024-05-01 ENCOUNTER — ANTICOAGULATION - WARFARIN VISIT (OUTPATIENT)
Dept: PHARMACY | Facility: HOSPITAL | Age: 74
End: 2024-05-01
Payer: MEDICARE

## 2024-05-01 DIAGNOSIS — I26.99 PULMONARY EMBOLISM, UNSPECIFIED CHRONICITY, UNSPECIFIED PULMONARY EMBOLISM TYPE, UNSPECIFIED WHETHER ACUTE COR PULMONALE PRESENT (MULTI): ICD-10-CM

## 2024-05-01 DIAGNOSIS — I82.4Y9 DEEP VEIN THROMBOSIS (DVT) OF PROXIMAL LOWER EXTREMITY, UNSPECIFIED CHRONICITY, UNSPECIFIED LATERALITY (MULTI): ICD-10-CM

## 2024-05-01 DIAGNOSIS — D68.61 ANTIPHOSPHOLIPID ANTIBODY SYNDROME (MULTI): Primary | ICD-10-CM

## 2024-05-01 LAB
POC INR: 1.5
POC PROTHROMBIN TIME: NORMAL

## 2024-05-01 PROCEDURE — 85610 PROTHROMBIN TIME: CPT | Mod: QW

## 2024-05-01 PROCEDURE — 99211 OFF/OP EST MAY X REQ PHY/QHP: CPT | Performed by: PHARMACIST

## 2024-05-01 NOTE — PROGRESS NOTES
Pt enrolled in [facility] for management of Antiphospholipid antibody syndrome (Multi) [D68.61].     Pt current location in clinic.     Current anticoagulant: Warfarin    Time since last visit: 1 weeks    Last INR: 2.7 on warfarin 35 mg in the previous week. Pt was instructed to HOLD for 3 days for colonoscopy at last visit.    Last Creatinine:   Lab Results   Component Value Date    CREATININE 1.15 (H) 12/13/2023     Last hemoglobin/hematocrit:  Lab Results   Component Value Date    HGB 13.2 12/13/2023     Lab Results   Component Value Date    HCT 42.7 12/13/2023       Current INR: 1.5 is SUBtherapeutic for goal range of 2.0-3.0 and is reflective of 25 mg in the previous week prior to visit.    Patient denies any missed doses.  Patient denies any medication changes, diet changes, or OTC/herbal supplement changes since last visit.  Patient denies any adverse reactions or barriers.  Patient denies any CP/SOB, fatigue, bleeding or bruising since last visit.   Patient denies any change in alcohol or tobacco use since last visit.   Patient denies any upcoming medical or dental procedures.    Plan:  Patient was instructed to  take 1.5 tablets (7.5 mg) today (5/1) only, then resume current dosing .  INR follow up will occur in 2 weeks.  Patient was instructed to maintain consistent vegetable intake, to monitor for any bruising or bleeding, and to call with any medication changes or concerns.    Pt handout given with above information    Elaine Fink, MicheleD

## 2024-05-09 LAB
LABORATORY COMMENT REPORT: NORMAL
PATH REPORT.FINAL DX SPEC: NORMAL
PATH REPORT.GROSS SPEC: NORMAL
PATH REPORT.TOTAL CANCER: NORMAL

## 2024-05-14 ENCOUNTER — TELEPHONE (OUTPATIENT)
Dept: GASTROENTEROLOGY | Facility: CLINIC | Age: 74
End: 2024-05-14
Payer: MEDICARE

## 2024-05-14 NOTE — TELEPHONE ENCOUNTER
PATIENT NOTIFIED AND VERBALIZED UNDERSTANDING     5 year repeat card made and filed    ----- Message from Jameson Young DO sent at 5/14/2024  7:12 AM EDT -----  Adenomatous polyp.  Follow-up colonoscopy in 5 years

## 2024-05-15 ENCOUNTER — ANTICOAGULATION - WARFARIN VISIT (OUTPATIENT)
Dept: PHARMACY | Facility: HOSPITAL | Age: 74
End: 2024-05-15
Payer: MEDICARE

## 2024-05-15 DIAGNOSIS — I82.4Y9 DEEP VEIN THROMBOSIS (DVT) OF PROXIMAL LOWER EXTREMITY, UNSPECIFIED CHRONICITY, UNSPECIFIED LATERALITY (MULTI): ICD-10-CM

## 2024-05-15 DIAGNOSIS — D68.61 ANTIPHOSPHOLIPID ANTIBODY SYNDROME (MULTI): Primary | ICD-10-CM

## 2024-05-15 DIAGNOSIS — I26.99 PULMONARY EMBOLISM, UNSPECIFIED CHRONICITY, UNSPECIFIED PULMONARY EMBOLISM TYPE, UNSPECIFIED WHETHER ACUTE COR PULMONALE PRESENT (MULTI): ICD-10-CM

## 2024-05-15 LAB
POC INR: 2.1
POC PROTHROMBIN TIME: NORMAL

## 2024-05-15 PROCEDURE — 85610 PROTHROMBIN TIME: CPT | Mod: QW

## 2024-05-15 PROCEDURE — 99211 OFF/OP EST MAY X REQ PHY/QHP: CPT | Performed by: PHARMACIST

## 2024-05-15 NOTE — PROGRESS NOTES
Pt enrolled in [facility] for management of Antiphospholipid antibody syndrome (Multi) [D68.61].     Pt current location in clinic.     Current anticoagulant: Warfarin    Time since last visit: 2 weeks    Last INR: 1.5 on warfarin 20 mg in the previous week.  Pt had been holding prior to procedure.  No changes were made at that time.    Last Creatinine:   Lab Results   Component Value Date    CREATININE 1.15 (H) 12/13/2023     Last hemoglobin/hematocrit:  Lab Results   Component Value Date    HGB 13.2 12/13/2023     Lab Results   Component Value Date    HCT 42.7 12/13/2023       Current INR: 2.1 is therapeutic for goal range of 2.0-3.0 and is reflective of 35 mg in the previous week prior to visit.    Patient denies any missed doses.  Patient denies any medication changes, diet changes, or OTC/herbal supplement changes since last visit.  Patient denies any adverse reactions or barriers.  Patient denies any CP/SOB, fatigue, bleeding or bruising since last visit.   Patient denies any change in alcohol or tobacco use since last visit.   Patient denies any upcoming medical or dental procedures.    Plan:  Patient was instructed to continue with current warfarin dose.  INR follow up will occur in 4 weeks.  Patient was instructed to maintain consistent vegetable intake, to monitor for any bruising or bleeding, and to call with any medication changes or concerns.    Pt handout given with above information    Taina Zamora, MicheleD

## 2024-05-17 ENCOUNTER — HOSPITAL ENCOUNTER (OUTPATIENT)
Dept: RADIOLOGY | Facility: EXTERNAL LOCATION | Age: 74
Discharge: HOME | End: 2024-05-17
Payer: MEDICARE

## 2024-06-04 DIAGNOSIS — I73.9 PAD (PERIPHERAL ARTERY DISEASE) (CMS-HCC): ICD-10-CM

## 2024-06-04 RX ORDER — CILOSTAZOL 50 MG/1
50 TABLET ORAL 2 TIMES DAILY
Qty: 180 TABLET | Refills: 3 | Status: SHIPPED | OUTPATIENT
Start: 2024-06-04

## 2024-06-06 ENCOUNTER — APPOINTMENT (OUTPATIENT)
Dept: ORTHOPEDIC SURGERY | Facility: CLINIC | Age: 74
End: 2024-06-06
Payer: MEDICARE

## 2024-06-06 ENCOUNTER — HOSPITAL ENCOUNTER (OUTPATIENT)
Dept: RADIOLOGY | Facility: EXTERNAL LOCATION | Age: 74
Discharge: HOME | End: 2024-06-06

## 2024-06-06 ENCOUNTER — OFFICE VISIT (OUTPATIENT)
Dept: ORTHOPEDIC SURGERY | Facility: CLINIC | Age: 74
End: 2024-06-06
Payer: MEDICARE

## 2024-06-06 DIAGNOSIS — M17.31 POST-TRAUMATIC OSTEOARTHRITIS OF RIGHT KNEE: ICD-10-CM

## 2024-06-06 DIAGNOSIS — M17.11 ARTHRITIS OF RIGHT KNEE: Primary | ICD-10-CM

## 2024-06-06 PROCEDURE — 99214 OFFICE O/P EST MOD 30 MIN: CPT | Performed by: NURSE PRACTITIONER

## 2024-06-06 PROCEDURE — 1159F MED LIST DOCD IN RCRD: CPT | Performed by: NURSE PRACTITIONER

## 2024-06-06 PROCEDURE — 20611 DRAIN/INJ JOINT/BURSA W/US: CPT | Performed by: NURSE PRACTITIONER

## 2024-06-06 PROCEDURE — 1036F TOBACCO NON-USER: CPT | Performed by: NURSE PRACTITIONER

## 2024-06-06 PROCEDURE — 1125F AMNT PAIN NOTED PAIN PRSNT: CPT | Performed by: NURSE PRACTITIONER

## 2024-06-06 PROCEDURE — 1160F RVW MEDS BY RX/DR IN RCRD: CPT | Performed by: NURSE PRACTITIONER

## 2024-06-06 RX ORDER — TRIAMCINOLONE ACETONIDE 40 MG/ML
40 INJECTION, SUSPENSION INTRA-ARTICULAR; INTRAMUSCULAR
Status: COMPLETED | OUTPATIENT
Start: 2024-06-06 | End: 2024-06-06

## 2024-06-06 RX ADMIN — TRIAMCINOLONE ACETONIDE 40 MG: 40 INJECTION, SUSPENSION INTRA-ARTICULAR; INTRAMUSCULAR at 13:50

## 2024-06-06 ASSESSMENT — PAIN SCALES - GENERAL: PAINLEVEL_OUTOF10: 8

## 2024-06-06 ASSESSMENT — ENCOUNTER SYMPTOMS
RESPIRATORY NEGATIVE: 1
ARTHRALGIAS: 1
CONSTITUTIONAL NEGATIVE: 1
KNEE SWELLING: 1
CARDIOVASCULAR NEGATIVE: 1
ENDOCRINE NEGATIVE: 1
PSYCHIATRIC NEGATIVE: 1
HEMATOLOGIC/LYMPHATIC NEGATIVE: 1
NEUROLOGICAL NEGATIVE: 1

## 2024-06-06 ASSESSMENT — PAIN DESCRIPTION - DESCRIPTORS: DESCRIPTORS: SHARP;SHOOTING

## 2024-06-06 ASSESSMENT — PAIN - FUNCTIONAL ASSESSMENT: PAIN_FUNCTIONAL_ASSESSMENT: 0-10

## 2024-06-06 NOTE — ASSESSMENT & PLAN NOTE
We reviewed conservative measures for knee OA symptom control.  Patient to continue to take Tylenol per package directions, diclofenac gel up to 4 times daily.  Patient to perform light activity today, gradually resume normal activities tomorrow and increase as tolerated.  Continue with home exercises and advance as tolerated.  We did discuss possibility of surgical evaluation at some point if symptoms are not well-controlled with injectables and home exercises.  Plan will be to follow-up in approximately 3 months, sooner for changes or concerns.  Patient is in agreement with plan of care.  This note was generated using Dragon software. It may contain errors in wording, punctuation or spelling.

## 2024-06-06 NOTE — PROGRESS NOTES
"Subjective    Patient ID: Marine Luna is a 74 y.o. female.    Chief Complaint: Injections of the Right Knee (Patient is here for follow up after Euflexxa injections into right knee, the last dose given on 03/06/2024.  She states \"the injections helped for about 2 months.\")    Right Knee       Marine is a pleasant 73-year-old female presenting today Euflexxa No. 3 of 3 viscosupplementation injection for R knee pain, OA flare.  Patient with good symptom relief with as needed Tylenol and topical diclofenac gel. No inew injuries or falls  Sx improvement after first and second inj, hip pain gone    3 month fuv visco  Helped for a couple months, gradually wearing off the last month  No new injuries  + increased stiffness and achiness after driving2 hrs today.       Review of Systems   Constitutional: Negative.    HENT: Negative.     Respiratory: Negative.     Cardiovascular: Negative.    Endocrine: Negative.    Musculoskeletal:  Positive for arthralgias.   Skin: Negative.    Neurological: Negative.    Hematological: Negative.    Psychiatric/Behavioral: Negative.         Objective   Knee Musculoskeletal Exam    Inspection    Right      Alignment: varus        Alignment comment: mild    Palpation    Right      Right knee palpation is unremarkable.      Range of Motion    Right      Active extension: 0      Active flexion: 120     Instability    Right      Instability signs: none - stable    Image Results:    4/20/2023 L knee  FINDINGS:  November 16, 2020 Advanced osteoarthritis right knee mostly medial  compartment. No fracture seen. No osseous lesions.     IMPRESSION:  Advanced osteoarthritis right knee particularly in the medial  compartment.    Lab Results   Component Value Date    INR 2.10 05/15/2024    INR 1.50 05/01/2024    INR 2.70 04/24/2024    PROTIME 27.3 (H) 09/05/2023        Patient ID: Marine Luna is a 74 y.o. female.    L Inj/Asp: R knee on 6/6/2024 1:50 PM  Indications: pain and joint " swelling  Details: 22 G needle, ultrasound-guided superolateral approach  Medications: 40 mg triamcinolone acetonide 40 mg/mL  Outcome: tolerated well, no immediate complications    We discussed risk and benefits of cortisone injection, patient wishes to proceed via verbal consent.  Skin was prepped with Betadine, Vapocoolant spray and alcohol.  Direct visualization using high-frequency linear probe of ultrasound was utilized to administer the injection of 40 mg Kenalog, 3 cc 1% lidocaine and 3 cc of bupivacaine.  Patient tolerated injection well lidocaine suppression.  No bleeding postinjection, bandage to site.  Images were saved under MRN number into the PACS system.   Procedure, treatment alternatives, risks and benefits explained, specific risks discussed. Consent was given by the patient. Immediately prior to procedure a time out was called to verify the correct patient, procedure, equipment, support staff and site/side marked as required. Patient was prepped and draped in the usual sterile fashion.           Assessment/Plan   Encounter Diagnoses:    Problem List Items Addressed This Visit             ICD-10-CM    Arthritis of right knee - Primary M17.11     We reviewed conservative measures for knee OA symptom control.  Patient to continue to take Tylenol per package directions, diclofenac gel up to 4 times daily.  Patient to perform light activity today, gradually resume normal activities tomorrow and increase as tolerated.  Continue with home exercises and advance as tolerated.  We did discuss possibility of surgical evaluation at some point if symptoms are not well-controlled with injectables and home exercises.  Plan will be to follow-up in approximately 3 months, sooner for changes or concerns.  Patient is in agreement with plan of care.  This note was generated using Dragon software. It may contain errors in wording, punctuation or spelling.            Relevant Orders    Point of Care Ultrasound  (Completed)     Other Visit Diagnoses         Codes    Post-traumatic osteoarthritis of right knee     M17.31    Relevant Orders    Point of Care Ultrasound (Completed)

## 2024-06-11 ENCOUNTER — LAB (OUTPATIENT)
Dept: LAB | Facility: LAB | Age: 74
End: 2024-06-11
Payer: MEDICARE

## 2024-06-11 ENCOUNTER — ANTICOAGULATION - WARFARIN VISIT (OUTPATIENT)
Dept: PHARMACY | Facility: HOSPITAL | Age: 74
End: 2024-06-11
Payer: MEDICARE

## 2024-06-11 DIAGNOSIS — I82.4Y9 DEEP VEIN THROMBOSIS (DVT) OF PROXIMAL LOWER EXTREMITY, UNSPECIFIED CHRONICITY, UNSPECIFIED LATERALITY (MULTI): Primary | ICD-10-CM

## 2024-06-11 DIAGNOSIS — D68.61 ANTIPHOSPHOLIPID ANTIBODY SYNDROME (MULTI): ICD-10-CM

## 2024-06-11 DIAGNOSIS — I82.90 DEEP VEIN THROMBOSIS (DVT) OF NON-EXTREMITY VEIN, UNSPECIFIED CHRONICITY: ICD-10-CM

## 2024-06-11 DIAGNOSIS — Z86.711 PERSONAL HISTORY OF PE (PULMONARY EMBOLISM): Primary | ICD-10-CM

## 2024-06-11 DIAGNOSIS — I82.4Y9 DEEP VEIN THROMBOSIS (DVT) OF PROXIMAL LOWER EXTREMITY, UNSPECIFIED CHRONICITY, UNSPECIFIED LATERALITY (MULTI): ICD-10-CM

## 2024-06-11 LAB
D DIMER PPP FEU-MCNC: <215 NG/ML FEU
POC INR: 1.8
POC PROTHROMBIN TIME: NORMAL

## 2024-06-11 PROCEDURE — 36415 COLL VENOUS BLD VENIPUNCTURE: CPT

## 2024-06-11 PROCEDURE — 85379 FIBRIN DEGRADATION QUANT: CPT

## 2024-06-11 PROCEDURE — 99211 OFF/OP EST MAY X REQ PHY/QHP: CPT | Performed by: PHARMACIST

## 2024-06-11 PROCEDURE — 85610 PROTHROMBIN TIME: CPT | Mod: QW

## 2024-06-11 NOTE — PROGRESS NOTES
Pt enrolled in [facility] for management of Personal history of PE (pulmonary embolism) [Z86.711].     Pt current location in clinic.     Current anticoagulant: Warfarin    Time since last visit: 4 weeks    Last INR: 2.1 on warfarin 35 mg in the previous week. No changes were made at that time.    Last Creatinine:   Lab Results   Component Value Date    CREATININE 1.15 (H) 12/13/2023     Last hemoglobin/hematocrit:  Lab Results   Component Value Date    HGB 13.2 12/13/2023     Lab Results   Component Value Date    HCT 42.7 12/13/2023       Current INR: 1.8 is SUBtherapeutic for goal range of 2.0-3.0 and is reflective of 35 mg in the previous week prior to visit.    Patient denies any missed doses.  Patient denies any medication changes, diet changes, or OTC/herbal supplement changes since last visit.  Patient denies any adverse reactions or barriers.  Patient denies any CP/SOB, fatigue, bleeding or bruising since last visit.   Patient denies any change in alcohol or tobacco use since last visit.   Patient denies any upcoming medical or dental procedures.    Plan:  Patient was instructed to  take 7.5mg today, then resume usual dose .  INR follow up will occur in 3 weeks.  Patient was instructed to maintain consistent vegetable intake, to monitor for any bruising or bleeding, and to call with any medication changes or concerns.    Pt handout given with above information    Vick Tabares, PharmD

## 2024-06-12 ENCOUNTER — APPOINTMENT (OUTPATIENT)
Dept: PHARMACY | Facility: HOSPITAL | Age: 74
End: 2024-06-12
Payer: MEDICARE

## 2024-06-12 ENCOUNTER — LAB (OUTPATIENT)
Dept: LAB | Facility: LAB | Age: 74
End: 2024-06-12
Payer: MEDICARE

## 2024-06-12 DIAGNOSIS — I82.4Y9 DEEP VEIN THROMBOSIS (DVT) OF PROXIMAL LOWER EXTREMITY, UNSPECIFIED CHRONICITY, UNSPECIFIED LATERALITY (MULTI): ICD-10-CM

## 2024-06-12 LAB
ALBUMIN SERPL BCP-MCNC: 4.6 G/DL (ref 3.4–5)
ALP SERPL-CCNC: 58 U/L (ref 33–136)
ALT SERPL W P-5'-P-CCNC: 13 U/L (ref 7–45)
ANION GAP SERPL CALC-SCNC: 11 MMOL/L (ref 10–20)
AST SERPL W P-5'-P-CCNC: 16 U/L (ref 9–39)
BASOPHILS # BLD AUTO: 0.05 X10*3/UL (ref 0–0.1)
BASOPHILS NFR BLD AUTO: 0.7 %
BILIRUB SERPL-MCNC: 0.3 MG/DL (ref 0–1.2)
BUN SERPL-MCNC: 25 MG/DL (ref 6–23)
CALCIUM SERPL-MCNC: 9.4 MG/DL (ref 8.6–10.3)
CHLORIDE SERPL-SCNC: 106 MMOL/L (ref 98–107)
CO2 SERPL-SCNC: 27 MMOL/L (ref 21–32)
CREAT SERPL-MCNC: 1.29 MG/DL (ref 0.5–1.05)
EGFRCR SERPLBLD CKD-EPI 2021: 44 ML/MIN/1.73M*2
EOSINOPHIL # BLD AUTO: 0.23 X10*3/UL (ref 0–0.4)
EOSINOPHIL NFR BLD AUTO: 3.2 %
ERYTHROCYTE [DISTWIDTH] IN BLOOD BY AUTOMATED COUNT: 12.5 % (ref 11.5–14.5)
GLUCOSE SERPL-MCNC: 103 MG/DL (ref 74–99)
HCT VFR BLD AUTO: 42.6 % (ref 36–46)
HGB BLD-MCNC: 13.6 G/DL (ref 12–16)
IMM GRANULOCYTES # BLD AUTO: 0.02 X10*3/UL (ref 0–0.5)
IMM GRANULOCYTES NFR BLD AUTO: 0.3 % (ref 0–0.9)
LYMPHOCYTES # BLD AUTO: 1.88 X10*3/UL (ref 0.8–3)
LYMPHOCYTES NFR BLD AUTO: 26.2 %
MCH RBC QN AUTO: 30.9 PG (ref 26–34)
MCHC RBC AUTO-ENTMCNC: 31.9 G/DL (ref 32–36)
MCV RBC AUTO: 97 FL (ref 80–100)
MONOCYTES # BLD AUTO: 0.59 X10*3/UL (ref 0.05–0.8)
MONOCYTES NFR BLD AUTO: 8.2 %
NEUTROPHILS # BLD AUTO: 4.4 X10*3/UL (ref 1.6–5.5)
NEUTROPHILS NFR BLD AUTO: 61.4 %
NRBC BLD-RTO: 0 /100 WBCS (ref 0–0)
PLATELET # BLD AUTO: 342 X10*3/UL (ref 150–450)
POTASSIUM SERPL-SCNC: 4.1 MMOL/L (ref 3.5–5.3)
PROT SERPL-MCNC: 7 G/DL (ref 6.4–8.2)
RBC # BLD AUTO: 4.4 X10*6/UL (ref 4–5.2)
SODIUM SERPL-SCNC: 140 MMOL/L (ref 136–145)
WBC # BLD AUTO: 7.2 X10*3/UL (ref 4.4–11.3)

## 2024-06-12 PROCEDURE — 85025 COMPLETE CBC W/AUTO DIFF WBC: CPT

## 2024-06-12 PROCEDURE — 36415 COLL VENOUS BLD VENIPUNCTURE: CPT

## 2024-06-12 PROCEDURE — 80053 COMPREHEN METABOLIC PANEL: CPT

## 2024-06-13 DIAGNOSIS — I26.99 PULMONARY EMBOLISM, UNSPECIFIED CHRONICITY, UNSPECIFIED PULMONARY EMBOLISM TYPE, UNSPECIFIED WHETHER ACUTE COR PULMONALE PRESENT (MULTI): ICD-10-CM

## 2024-06-14 ENCOUNTER — APPOINTMENT (OUTPATIENT)
Dept: HEMATOLOGY/ONCOLOGY | Facility: CLINIC | Age: 74
End: 2024-06-14
Payer: MEDICARE

## 2024-06-19 ENCOUNTER — APPOINTMENT (OUTPATIENT)
Dept: PRIMARY CARE | Facility: CLINIC | Age: 74
End: 2024-06-19
Payer: MEDICARE

## 2024-06-19 VITALS
HEIGHT: 64 IN | HEART RATE: 67 BPM | DIASTOLIC BLOOD PRESSURE: 76 MMHG | WEIGHT: 195 LBS | SYSTOLIC BLOOD PRESSURE: 137 MMHG | BODY MASS INDEX: 33.29 KG/M2

## 2024-06-19 DIAGNOSIS — F33.1 MODERATE EPISODE OF RECURRENT MAJOR DEPRESSIVE DISORDER (MULTI): ICD-10-CM

## 2024-06-19 DIAGNOSIS — E66.01 MORBID (SEVERE) OBESITY DUE TO EXCESS CALORIES (MULTI): ICD-10-CM

## 2024-06-19 DIAGNOSIS — I74.3 EMBOLISM AND THROMBOSIS OF ARTERIES OF THE LOWER EXTREMITIES (MULTI): ICD-10-CM

## 2024-06-19 DIAGNOSIS — R53.82 CHRONIC FATIGUE: ICD-10-CM

## 2024-06-19 DIAGNOSIS — D68.61 ANTIPHOSPHOLIPID ANTIBODY SYNDROME (MULTI): ICD-10-CM

## 2024-06-19 DIAGNOSIS — B00.1 COLD SORE: ICD-10-CM

## 2024-06-19 DIAGNOSIS — I11.9 HYPERTENSIVE HEART DISEASE, UNSPECIFIED WHETHER HEART FAILURE PRESENT: Primary | ICD-10-CM

## 2024-06-19 DIAGNOSIS — N18.31 CHRONIC KIDNEY DISEASE, STAGE 3A (MULTI): ICD-10-CM

## 2024-06-19 DIAGNOSIS — I82.4Y9 DEEP VEIN THROMBOSIS (DVT) OF PROXIMAL LOWER EXTREMITY, UNSPECIFIED CHRONICITY, UNSPECIFIED LATERALITY (MULTI): ICD-10-CM

## 2024-06-19 DIAGNOSIS — F32.A ANXIETY AND DEPRESSION: ICD-10-CM

## 2024-06-19 DIAGNOSIS — E55.9 VITAMIN D DEFICIENCY: ICD-10-CM

## 2024-06-19 DIAGNOSIS — Z00.00 MEDICARE ANNUAL WELLNESS VISIT, SUBSEQUENT: ICD-10-CM

## 2024-06-19 DIAGNOSIS — I26.99 PULMONARY EMBOLISM, UNSPECIFIED CHRONICITY, UNSPECIFIED PULMONARY EMBOLISM TYPE, UNSPECIFIED WHETHER ACUTE COR PULMONALE PRESENT (MULTI): ICD-10-CM

## 2024-06-19 DIAGNOSIS — I73.9 PAD (PERIPHERAL ARTERY DISEASE) (CMS-HCC): ICD-10-CM

## 2024-06-19 DIAGNOSIS — E03.9 ACQUIRED HYPOTHYROIDISM: ICD-10-CM

## 2024-06-19 DIAGNOSIS — F41.9 ANXIETY AND DEPRESSION: ICD-10-CM

## 2024-06-19 PROCEDURE — 1036F TOBACCO NON-USER: CPT | Performed by: INTERNAL MEDICINE

## 2024-06-19 PROCEDURE — G0439 PPPS, SUBSEQ VISIT: HCPCS | Performed by: INTERNAL MEDICINE

## 2024-06-19 PROCEDURE — 1159F MED LIST DOCD IN RCRD: CPT | Performed by: INTERNAL MEDICINE

## 2024-06-19 PROCEDURE — 1160F RVW MEDS BY RX/DR IN RCRD: CPT | Performed by: INTERNAL MEDICINE

## 2024-06-19 PROCEDURE — 99214 OFFICE O/P EST MOD 30 MIN: CPT | Performed by: INTERNAL MEDICINE

## 2024-06-19 RX ORDER — VALACYCLOVIR HYDROCHLORIDE 500 MG/1
1000 TABLET, FILM COATED ORAL DAILY
Qty: 180 TABLET | Refills: 3 | Status: SHIPPED | OUTPATIENT
Start: 2024-06-19

## 2024-06-19 RX ORDER — LOSARTAN POTASSIUM 25 MG/1
25 TABLET ORAL DAILY
Qty: 90 TABLET | Refills: 3 | Status: SHIPPED | OUTPATIENT
Start: 2024-06-19 | End: 2025-06-19

## 2024-06-19 RX ORDER — ROSUVASTATIN CALCIUM 5 MG/1
2.5 TABLET, COATED ORAL DAILY
Qty: 45 TABLET | Refills: 3 | Status: SHIPPED | OUTPATIENT
Start: 2024-06-19

## 2024-06-19 ASSESSMENT — PATIENT HEALTH QUESTIONNAIRE - PHQ9
1. LITTLE INTEREST OR PLEASURE IN DOING THINGS: NOT AT ALL
SUM OF ALL RESPONSES TO PHQ9 QUESTIONS 1 AND 2: 0
2. FEELING DOWN, DEPRESSED OR HOPELESS: NOT AT ALL

## 2024-06-19 NOTE — PROGRESS NOTES
Chief Complaint: Medicare Wellness Exam/Comprehensive Problem Focused Follow Up and Physical Exam    HPI:  Patient is here today for MWV.  Complains of chronic fatigue but that is not new.    Due to recheck tsh.           Active Problem List  Patient Active Problem List   Diagnosis    DVT (deep venous thrombosis) (Multi)    Gait difficulty    Arthritis of right knee    PAD (peripheral artery disease) (CMS-formerly [facility])    Plantar fasciitis, left    Pulmonary embolism (Multi)    Antiphospholipid antibody syndrome (Multi)    Morbid (severe) obesity due to excess calories (Multi)    Acquired hypothyroidism    Chronic fatigue    Vitamin D deficiency    Encounter for osteoporosis screening in asymptomatic postmenopausal patient    Need for influenza vaccination    Anxiety and depression    Deep vein thrombosis (DVT) of proximal lower extremity, unspecified chronicity, unspecified laterality (Multi)    Pulmonary embolism, unspecified chronicity, unspecified pulmonary embolism type, unspecified whether acute cor pulmonale present (Multi)    Iliotibial band syndrome of right side    Embolism and thrombosis of arteries of the lower extremities (Multi)    Moderate episode of recurrent major depressive disorder (Multi)    Chronic kidney disease, stage 3a (Multi)       Comprehensive Medical/Surgical/Social/Family History  Past Medical History:   Diagnosis Date    Blood clot in vein     Chronic kidney disease     Disease of thyroid gland     Hypertension     Seasonal allergies      Past Surgical History:   Procedure Laterality Date    COLONOSCOPY      OTHER SURGICAL HISTORY  2022    Knee surgery    OTHER SURGICAL HISTORY  2022    Finger fracture repair     Social History     Tobacco Use    Smoking status: Former     Current packs/day: 0.00     Average packs/day: 1 pack/day for 25.0 years (25.0 ttl pk-yrs)     Types: Cigarettes     Start date: 1979     Quit date: 2004     Years since quittin.4     Passive  exposure: Past    Smokeless tobacco: Never   Vaping Use    Vaping status: Never Used   Substance Use Topics    Alcohol use: Not Currently    Drug use: Never     Family History   Problem Relation Name Age of Onset    Hypertension Mother      Other (valvur heart disease) Mother      Hypertension Father      Heart attack Father      Breast cancer Sister           Allergies and Medications  Statins-hmg-coa reductase inhibitors  Current Outpatient Medications on File Prior to Visit   Medication Sig Dispense Refill    aspirin 81 mg EC tablet Take 1 tablet (81 mg) by mouth once daily.      buPROPion SR (Wellbutrin SR) 150 mg 12 hr tablet Take 1 tablet (150 mg) by mouth once daily. 90 tablet 3    calcium carbonate-vitamin D3 600 mg-5 mcg (200 unit) tablet Take 1 tablet by mouth once daily.      carbidopa-levodopa (Sinemet)  mg tablet TAKE 1 TABLET BY MOUTH 3 TIMES  DAILY 270 tablet 3    carvedilol (Coreg) 6.25 mg tablet Take 0.5 tablets (3.125 mg) by mouth 2 times a day with meals. 90 tablet 3    cholecalciferol (Vitamin D-3) 25 MCG (1000 UT) capsule Take 1 capsule (25 mcg) by mouth 2 times a day.      cilostazol (Pletal) 50 mg tablet TAKE 1 TABLET BY MOUTH TWICE  DAILY 180 tablet 3    coenzyme Q-10 100 mg capsule Take 1 capsule (100 mg) by mouth once daily.      Coreg 3.125 mg tablet Take 1 tablet (3.125 mg) by mouth 2 times a day with meals. 180 tablet 3    docosahexaenoic acid/epa (FISH OIL ORAL) Take 1 capsule by mouth early in the morning..      ezetimibe (Zetia) 10 mg tablet Take 1 tablet (10 mg) by mouth once daily. 90 tablet 3    fexofenadine HCl (ALLEGRA ORAL) Take 1 tablet by mouth once daily.      FLUoxetine (PROzac) 20 mg capsule Take 1 capsule (20 mg) by mouth once daily. 90 capsule 3    levothyroxine (Synthroid, Levoxyl) 50 mcg tablet Take 1 tablet (50 mcg) by mouth once daily. 90 tablet 3    warfarin (Coumadin) 5 mg tablet Take 1 and 1/2 tablets (7.5 mg) by mouth daily or as directed by anticoagulation  clinic. 135 tablet 3    [DISCONTINUED] Cozaar 25 mg tablet Take 1 tablet (25 mg) by mouth once daily.      [DISCONTINUED] Crestor 5 mg tablet Take 0.5 tablets (2.5 mg) by mouth once daily. 90 tablet 3    [DISCONTINUED] valACYclovir (Valtrex) 500 mg tablet Take 2 tablets (1,000 mg) by mouth once daily.       No current facility-administered medications on file prior to visit.       Medicare Wellness Questionnaire        How have you been on Medicare less than a year ? No  Have you had a Medicare Wellness exam before ? Yes  Have you had any surgeries in the last year ? No  Have you developed any new diseases in the last year ? No  Have any close family members developed new diseases in the last year ? No  Have you been to a the hospital in the last year ? No  Do you take any pills or supplements other than those prescribed for you ? Yes  Do you take any opiates for pain such as Tramadol, Percocet or Norco ? No  How do you consider your overall health ?Fair  Have you ever used tobacco products ? Yes   Have you smoked more than 100 cigarettes in your life ?  Yes  What form of tobacco have you used ? Cigarettes  How many packs per day ? 3/4 ppd How many years ? 30   Have you quit ? Yes  Would you like to quit ? No  Do you drink alcohol ?  No   Have you ever used illegal drugs at anytime in your life including Marijuana ? No   Which of the following describes your diet ?Well balanced  How many days per week on average do you exercise ? Tries to a few times a week  days  Do you have any loss of hearing ? No  Do you have hearing aids ? No  Have you or others noted you have loss of memory ? No  Do you need someone to assist you with any of the following ? None   Do you need someone to assist you with any of the following ? None   Have you fallen in the last 6 months ? No  Do you have any of the following in your house ?  None   Do you have a living will ? Yes  Do you have a durable power of  for health care decisions  "? Yes    Medications and Supplements  prescribed by me and other practitioners or clinical pharmacist (such as prescriptions, OTC's, herbal therapies and supplements) were reviewed and documented in the medical record.    Tobacco/Alcohol/Opioid use, as well as Illicit Drug Use was screened for/reviewed and documented in Social History section and medication list as appropriate  Activities of Daily Living  In your present state of health, do you have any difficulty performing the following activities?:   Preparing food and eating?: No  Bathing yourself: No  Getting dressed: No  Using the toilet:No  Moving around from place to place: No  In the past year have you fallen or had a near fall?:No    Depression Screen  (Note: if answer to either of the following is \"Yes\", then a more complete depression screening is indicated)   Q1: Over the past two weeks, have you felt down, depressed or hopeless?   Yes   Q2: Over the past two weeks, have you felt little interest or pleasure in doing things?   Yes     Declines to change medications      Current exercise habits: Home exercise routine includes walking 0.5 hrs per day.   Dietary issues discussed: Yes  Hearing difficulties: No  Safe in current home environment: yes  Visual Acuity assessed: no  Cognitive Impairment assessed: yes       Advance directives  Advanced Care Planning (including a Living Will, Healthcare POA, as well as specific end of life choices and/or directives), was discussed for approximately 1 minutes with the patient and/or surrogate, voluntarily, and documented in the medical record.     Cardiac Risk Assessment  Cardiovascular risk was discussed and, if needed, lifestyle modifications recommended, including nutritional choices, exercise, and elimination of habits contributing to risk. We agreed on a plan to reduce the current cardiovascular risk based on above discussion as needed.  Aspirin use/disuse was discussed after reviewing the updated guidelines " "below:    Consider low dose Aspirin ( mg) use if the benefit for cardiovascular disease prevention outweighs risk for bleeding complications.   In general, low dose ASA should be considered:  In patients WITHOUT prior MI/stroke/PAD (primary prevention):   a. Age <60: Use if 10-year cardiovascular disease risk >20%, with discussion of risks and benefits with patient  b. Age 60-<70: Use if 10-year cardiovascular disease risk >20% and low bleeding (e.g., gastrointenstinal) risk, with discussion of risks and benefits with patient  c. Age >=70: Do not use    In patients WITH prior MI/stroke/PAD (secondary prevention):   Generally use unless extremely high bleeding (e.g., gastrointenstinal) risk, with discussion of risks and benefits with patient    ROS otherwise negative aside from what was mentioned above in HPI.    Vitals  /76   Pulse 67   Ht 1.626 m (5' 4\")   Wt 88.5 kg (195 lb)   LMP  (LMP Unknown)   BMI 33.47 kg/m²   Body mass index is 33.47 kg/m².  Physical Exam  Gen: Alert, NAD  HEENT:  PERRLA, EOMI, conjunctiva and sclera normal in appearance.   Neck:  Supple with FROM; No masses/nodes palpable; Thyroid nontender and without nodules; No HANK  Respiratory:  Lungs CTAB  Cardiovascular:  Heart RRR. No M/R/G. Peripheral pulses equal bilaterally  Abdomen:  Soft, nontender, BS present throughout; No R/G/R; No HSM or masses palpated  Extremities:  FROM all extremities; Muscle strength grossly normal with good tone  Neuro:  CN II-XII intact; Reflexes 2+/2+; Gross motor and sensory intact  Skin:  No suspicious lesions present      Assessment and Plan:  Problem List Items Addressed This Visit       PAD (peripheral artery disease) (CMS-HCC)    Relevant Medications    rosuvastatin (Crestor) 5 mg tablet    Antiphospholipid antibody syndrome (Multi)    Morbid (severe) obesity due to excess calories (Multi)    Acquired hypothyroidism    Relevant Orders    TSH with reflex to Free T4 if abnormal    Chronic fatigue "    Vitamin D deficiency    Anxiety and depression    Deep vein thrombosis (DVT) of proximal lower extremity, unspecified chronicity, unspecified laterality (Multi)    Pulmonary embolism, unspecified chronicity, unspecified pulmonary embolism type, unspecified whether acute cor pulmonale present (Multi)    Embolism and thrombosis of arteries of the lower extremities (Multi)    Moderate episode of recurrent major depressive disorder (Multi)    Chronic kidney disease, stage 3a (Multi)     Other Visit Diagnoses       Hypertensive heart disease, unspecified whether heart failure present    -  Primary    Relevant Medications    losartan (Cozaar) 25 mg tablet    Cold sore        Relevant Medications    valACYclovir (Valtrex) 500 mg tablet             Hypertensive heart disease, HTN, HLD, PAD   - hx of PAD with likely embolic in nature with possibly thrombus in the distal aorta and right illiac in 2006  - on cilostazal 50mg po daily      2. Unprovoked DVT with bilateral PE in 2/22   - following with Hematology and Vascular   - + for antiphospholipid   - on coumadin       3. Anxiety and depression   - declines to change medications   - on wellbutrin 150mg po daily   - continue prozac 20mg po daily   - uses lorazepam sparingly     4. Hypothyroidism   - on synthroid   - due to recheck tsh      5.  Former smoker   Smoked 30 years and smoked 3/4 ppd, quit in 2004     6. Fatigue   - declines to do sleep study     Mammo 2020, 1/24  DEXA 2020, 1/24  Colonoscopy 2013, positive cologuard, colonoscopy4/23 one polyp, return in 5 years   Pap 2017?, not needed anymore     Flu shot 2022, 2023   COVID received   PNA received   Shingles zostavax, received 1st shingrix and had outbreak of shingles   RSV recommended        During the course of the visit the patient was educated and counseled about age appropriate screening and preventive services. Completed preventive screenings were documented in the chart and orders were placed for  outstanding screenings/procedures as documented in the Assessment and Plan.      Patient Instructions (the written plan) was given to the patient at check out.      Renuka Costello, DO

## 2024-06-20 ENCOUNTER — OFFICE VISIT (OUTPATIENT)
Dept: HEMATOLOGY/ONCOLOGY | Facility: CLINIC | Age: 74
End: 2024-06-20
Payer: MEDICARE

## 2024-06-20 VITALS
WEIGHT: 195.6 LBS | DIASTOLIC BLOOD PRESSURE: 73 MMHG | HEART RATE: 63 BPM | TEMPERATURE: 96.5 F | SYSTOLIC BLOOD PRESSURE: 129 MMHG | BODY MASS INDEX: 33.57 KG/M2 | RESPIRATION RATE: 18 BRPM | OXYGEN SATURATION: 96 %

## 2024-06-20 DIAGNOSIS — I82.90 DEEP VEIN THROMBOSIS (DVT) OF NON-EXTREMITY VEIN, UNSPECIFIED CHRONICITY: ICD-10-CM

## 2024-06-20 PROCEDURE — 99213 OFFICE O/P EST LOW 20 MIN: CPT

## 2024-06-20 PROCEDURE — 1125F AMNT PAIN NOTED PAIN PRSNT: CPT

## 2024-06-20 PROCEDURE — 1159F MED LIST DOCD IN RCRD: CPT

## 2024-06-20 ASSESSMENT — COLUMBIA-SUICIDE SEVERITY RATING SCALE - C-SSRS
6. HAVE YOU EVER DONE ANYTHING, STARTED TO DO ANYTHING, OR PREPARED TO DO ANYTHING TO END YOUR LIFE?: NO
1. IN THE PAST MONTH, HAVE YOU WISHED YOU WERE DEAD OR WISHED YOU COULD GO TO SLEEP AND NOT WAKE UP?: NO
2. HAVE YOU ACTUALLY HAD ANY THOUGHTS OF KILLING YOURSELF?: NO

## 2024-06-20 ASSESSMENT — PAIN SCALES - GENERAL: PAINLEVEL: 7

## 2024-06-20 NOTE — PROGRESS NOTES
Pt getting next labs at the hosp 12/16  Rtc 12/19 230 coming from [facility] appt  Reviewed AVS with patient- patient verbalizes understanding

## 2024-06-20 NOTE — PROGRESS NOTES
Patient ID: Marine Luna is a 74 y.o. female.    Subjective    HPI    Visit Type: Follow Up Visit      History of Present Illness:      ID Statement:    MARINE LUNA is a 73 year old Female        Chief Complaint: Evaluation for PE   Interval History:    Referred by Jose C Liang MD      Reason for referral: PE      HPI  71 year old woman with hx of GERD, HL, depression/anxiety, HTN who presented to ER on 2/7/22 with back pain and abdominal pain, a CT C/A/P  showed acute bilateral PE with b/l GGO possibly infarcts, she had also dopplers of b/l LE that showed b/l acute DVTs. She was started on heparin and discharged on Eliquis, she was prior to that on Plavix and cilostazol which was stopped.      she had pain in the LE b/l, then se started having difficulty breathing, she was finally convinced to go to the hospital where she had above investigations      in 2007 he had similar symptoms, with blue toes, but at that time she had embolic event to the toe     she feels she is improved now her back pain is better and her breathing is better   her feet and legs are hurting more , attributes that to not taking plavix or cilostazol   she is active and up on her feet but has WILSON     she has been eating well,   no weight loss   tolerating eliquis well without bleeding   no fever or infections   no night sweats      she had MMG last year that was negative and will have her yearly one this   no recent pap smear   had colonoscopy in 2013      she was found to have positive APLA and was switched to coumadin      interval history - 6/20/24     She is on warfarin- 5mg everyday and on Friday and Monday takes 1.5 pills  INR has been mostly therapeutic   bruises easily     Energy is doing well, remains to stay active by doing [employer]  Appetite is good, eating and drinking well   WILSON with stairs, otherwise no breathing issues   No chest pain or pressure      No hematochezia or melena  No urinary issues, no hematuria       Edema at times in ankles, knee     Knee and ankle pain, unchanged   restless leg remains unchanged, takes sinemet for control   No swelling or LLE pain, continues to wear compression stockings            Objective    BSA: 2 meters squared  Resp 18   Wt 88.7 kg (195 lb 9.6 oz)   LMP  (LMP Unknown)   BMI 33.57 kg/m²      Physical Exam  Vitals and nursing note reviewed.   Constitutional:       Appearance: Normal appearance.   HENT:      Head: Normocephalic and atraumatic.      Nose: Nose normal.      Mouth/Throat:      Pharynx: Oropharynx is clear.   Eyes:      General: No scleral icterus.     Extraocular Movements: Extraocular movements intact.      Conjunctiva/sclera: Conjunctivae normal.   Cardiovascular:      Rate and Rhythm: Normal rate and regular rhythm.      Pulses: Normal pulses.      Heart sounds: Normal heart sounds.   Pulmonary:      Effort: Pulmonary effort is normal.      Breath sounds: Normal breath sounds.   Abdominal:      Palpations: Abdomen is soft.      Tenderness: There is no abdominal tenderness.   Musculoskeletal:         General: Swelling present. Normal range of motion.      Cervical back: Normal range of motion.   Skin:     General: Skin is warm and dry.      Findings: Bruising present.   Neurological:      General: No focal deficit present.      Mental Status: She is alert and oriented to person, place, and time.   Psychiatric:         Mood and Affect: Mood normal.         Behavior: Behavior normal.         Thought Content: Thought content normal.         Judgment: Judgment normal.         Performance Status:  Symptomatic; fully ambulatory      Assessment/Plan      Assessment:    1. 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 PT/INRs have mostly been with in therapeutic range up until recently which was PT- 21.5, INR - 1.8. Patient was dose adjusted accordingly by coumadin clinic.  She denies any abnormal bleeding  or bruising problems. Her D-dimer is with in normal limits. She is tolerating warfarin with no issues. Will continue to monitor pt.     Hgb is stable at 13.2. Previously discussed checking additional lab for nutritional deficiency such as iron or b12. Since her labs have normalized, will hold on any additional labs at this time. Will continue to periodically see patient for bleeding risk  assessments.      Educated patient on calling or  visit closest ER with any persistent headaches, visual disturbances, weakness, numbness, severe bleeding/bruising or any concerning signs or symptoms.  - May need to wear a medical alert bracelet stating the use of anticoagulants.  - To call and schedule appt with one of our physicians for pre-operative clearance and recommendations at least 1 month before any invasive procedures/surgeries     6/20/24- Routine follow-up today. Continues taking warfarin. PT/INRs have essentially been stable. Denies any bleeding risk or clinical concerns. Patient remains to bruise easily. Over patient is doing well.      Continue on warfarin- managed by coumadin clinic. Will continue to follow patient for periodic bleeding risk assessments.      Labs reviewed and remains stable.  6/12/24 Hg 13.6, WBC 7.2, Plts 342k        2. History of PAD   continue vascular follow up   she is restarted on cilostazol but not plavix      RTC 6 m with labs (CBC w/diff, CMP)       Rona Lang, APRDAYSI-CNP

## 2024-07-03 ENCOUNTER — ANTICOAGULATION - WARFARIN VISIT (OUTPATIENT)
Dept: PHARMACY | Facility: HOSPITAL | Age: 74
End: 2024-07-03
Payer: MEDICARE

## 2024-07-03 ENCOUNTER — LAB (OUTPATIENT)
Dept: LAB | Facility: LAB | Age: 74
End: 2024-07-03
Payer: MEDICARE

## 2024-07-03 DIAGNOSIS — E03.9 ACQUIRED HYPOTHYROIDISM: ICD-10-CM

## 2024-07-03 DIAGNOSIS — I26.99 PULMONARY EMBOLISM, UNSPECIFIED CHRONICITY, UNSPECIFIED PULMONARY EMBOLISM TYPE, UNSPECIFIED WHETHER ACUTE COR PULMONALE PRESENT (MULTI): ICD-10-CM

## 2024-07-03 DIAGNOSIS — D68.61 ANTIPHOSPHOLIPID ANTIBODY SYNDROME (MULTI): Primary | ICD-10-CM

## 2024-07-03 DIAGNOSIS — I82.4Y9 DEEP VEIN THROMBOSIS (DVT) OF PROXIMAL LOWER EXTREMITY, UNSPECIFIED CHRONICITY, UNSPECIFIED LATERALITY (MULTI): ICD-10-CM

## 2024-07-03 DIAGNOSIS — I82.90 DEEP VEIN THROMBOSIS (DVT) OF NON-EXTREMITY VEIN, UNSPECIFIED CHRONICITY: ICD-10-CM

## 2024-07-03 LAB
ALBUMIN SERPL BCP-MCNC: 4.5 G/DL (ref 3.4–5)
ALP SERPL-CCNC: 54 U/L (ref 33–136)
ALT SERPL W P-5'-P-CCNC: 11 U/L (ref 7–45)
ANION GAP SERPL CALC-SCNC: 9 MMOL/L (ref 10–20)
AST SERPL W P-5'-P-CCNC: 17 U/L (ref 9–39)
BASOPHILS # BLD AUTO: 0.05 X10*3/UL (ref 0–0.1)
BASOPHILS NFR BLD AUTO: 0.9 %
BILIRUB SERPL-MCNC: 0.3 MG/DL (ref 0–1.2)
BUN SERPL-MCNC: 20 MG/DL (ref 6–23)
CALCIUM SERPL-MCNC: 9.6 MG/DL (ref 8.6–10.3)
CHLORIDE SERPL-SCNC: 105 MMOL/L (ref 98–107)
CO2 SERPL-SCNC: 30 MMOL/L (ref 21–32)
CREAT SERPL-MCNC: 1.2 MG/DL (ref 0.5–1.05)
EGFRCR SERPLBLD CKD-EPI 2021: 48 ML/MIN/1.73M*2
EOSINOPHIL # BLD AUTO: 0.26 X10*3/UL (ref 0–0.4)
EOSINOPHIL NFR BLD AUTO: 4.7 %
ERYTHROCYTE [DISTWIDTH] IN BLOOD BY AUTOMATED COUNT: 12.9 % (ref 11.5–14.5)
GLUCOSE SERPL-MCNC: 99 MG/DL (ref 74–99)
HCT VFR BLD AUTO: 43.7 % (ref 36–46)
HGB BLD-MCNC: 13.7 G/DL (ref 12–16)
IMM GRANULOCYTES # BLD AUTO: 0.02 X10*3/UL (ref 0–0.5)
IMM GRANULOCYTES NFR BLD AUTO: 0.4 % (ref 0–0.9)
LYMPHOCYTES # BLD AUTO: 1.73 X10*3/UL (ref 0.8–3)
LYMPHOCYTES NFR BLD AUTO: 31.2 %
MCH RBC QN AUTO: 30.6 PG (ref 26–34)
MCHC RBC AUTO-ENTMCNC: 31.4 G/DL (ref 32–36)
MCV RBC AUTO: 98 FL (ref 80–100)
MONOCYTES # BLD AUTO: 0.47 X10*3/UL (ref 0.05–0.8)
MONOCYTES NFR BLD AUTO: 8.5 %
NEUTROPHILS # BLD AUTO: 3.01 X10*3/UL (ref 1.6–5.5)
NEUTROPHILS NFR BLD AUTO: 54.3 %
NRBC BLD-RTO: 0 /100 WBCS (ref 0–0)
PLATELET # BLD AUTO: 280 X10*3/UL (ref 150–450)
POC INR: 1.9
POC PROTHROMBIN TIME: NORMAL
POTASSIUM SERPL-SCNC: 4 MMOL/L (ref 3.5–5.3)
PROT SERPL-MCNC: 6.6 G/DL (ref 6.4–8.2)
RBC # BLD AUTO: 4.48 X10*6/UL (ref 4–5.2)
SODIUM SERPL-SCNC: 140 MMOL/L (ref 136–145)
TSH SERPL-ACNC: 3.64 MIU/L (ref 0.44–3.98)
WBC # BLD AUTO: 5.5 X10*3/UL (ref 4.4–11.3)

## 2024-07-03 PROCEDURE — 99211 OFF/OP EST MAY X REQ PHY/QHP: CPT | Performed by: PHARMACIST

## 2024-07-03 PROCEDURE — 85610 PROTHROMBIN TIME: CPT | Mod: QW

## 2024-07-03 PROCEDURE — 84443 ASSAY THYROID STIM HORMONE: CPT

## 2024-07-03 PROCEDURE — 36415 COLL VENOUS BLD VENIPUNCTURE: CPT

## 2024-07-03 PROCEDURE — 80053 COMPREHEN METABOLIC PANEL: CPT

## 2024-07-03 PROCEDURE — 85025 COMPLETE CBC W/AUTO DIFF WBC: CPT

## 2024-07-03 NOTE — PROGRESS NOTES
Pt enrolled in [facility] for management of Antiphospholipid antibody syndrome (Multi) [D68.61].     Pt current location in clinic.     Current anticoagulant: Warfarin    Time since last visit: 3 weeks    Last INR: 1.8 on warfarin 35 mg in the previous week. Pt was instructed to take 7.5mg once, then resume usual dosing at last visit.    Last Creatinine:   Lab Results   Component Value Date    CREATININE 1.29 (H) 06/12/2024     Last hemoglobin/hematocrit:  Lab Results   Component Value Date    HGB 13.6 06/12/2024     Lab Results   Component Value Date    HCT 42.6 06/12/2024       Current INR: 1.9 is therapeutic for goal range of 2.0-3.0 and is reflective of 35 mg in the previous week prior to visit.    Patient denies any missed doses.  Patient denies any medication changes, diet changes, or OTC/herbal supplement changes since last visit.  Patient denies any adverse reactions or barriers.  Patient denies any CP/SOB, fatigue, bleeding or bruising since last visit.   Patient denies any change in alcohol or tobacco use since last visit.   Patient denies any upcoming medical or dental procedures.    Plan:  Patient was instructed to  start 37.5 mg weekly .  INR follow up will occur in 5 weeks, pt will be out of town off and on over the next few weeks.  Patient was instructed to maintain consistent vegetable intake, to monitor for any bruising or bleeding, and to call with any medication changes or concerns.    Pt handout given with above information    Vick Tabares, PharmD  P:[phone]  F:[phone]

## 2024-07-19 ENCOUNTER — HOSPITAL ENCOUNTER (EMERGENCY)
Facility: HOSPITAL | Age: 74
Discharge: HOME | End: 2024-07-19
Attending: EMERGENCY MEDICINE
Payer: MEDICARE

## 2024-07-19 VITALS
HEART RATE: 66 BPM | HEIGHT: 64 IN | WEIGHT: 195 LBS | OXYGEN SATURATION: 97 % | RESPIRATION RATE: 16 BRPM | SYSTOLIC BLOOD PRESSURE: 109 MMHG | BODY MASS INDEX: 33.29 KG/M2 | DIASTOLIC BLOOD PRESSURE: 48 MMHG
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
## 2024-07-19 DIAGNOSIS — R79.1 ELEVATED INR: ICD-10-CM

## 2024-07-19 DIAGNOSIS — T14.8XXA BRUISING: Primary | ICD-10-CM

## 2024-07-19 LAB
INR PPP: 3.7 (ref 0.9–1.1)
PROTHROMBIN TIME: 43 SECONDS (ref 9.8–12.8)

## 2024-07-19 PROCEDURE — 90471 IMMUNIZATION ADMIN: CPT | Performed by: EMERGENCY MEDICINE

## 2024-07-19 PROCEDURE — 99283 EMERGENCY DEPT VISIT LOW MDM: CPT | Mod: 25

## 2024-07-19 PROCEDURE — 36415 COLL VENOUS BLD VENIPUNCTURE: CPT | Performed by: EMERGENCY MEDICINE

## 2024-07-19 PROCEDURE — 85610 PROTHROMBIN TIME: CPT | Performed by: EMERGENCY MEDICINE

## 2024-07-19 PROCEDURE — 2500000004 HC RX 250 GENERAL PHARMACY W/ HCPCS (ALT 636 FOR OP/ED): Performed by: EMERGENCY MEDICINE

## 2024-07-19 PROCEDURE — 90715 TDAP VACCINE 7 YRS/> IM: CPT | Performed by: EMERGENCY MEDICINE

## 2024-07-19 ASSESSMENT — PAIN SCALES - GENERAL
PAINLEVEL_OUTOF10: 5 - MODERATE PAIN
PAINLEVEL_OUTOF10: 5 - MODERATE PAIN

## 2024-07-19 ASSESSMENT — PAIN - FUNCTIONAL ASSESSMENT: PAIN_FUNCTIONAL_ASSESSMENT: 0-10

## 2024-07-19 NOTE — ED PROVIDER NOTES
HPI   Chief Complaint   Patient presents with    Fall     Patient had a fall on Thursday and hit a desk with her R arm,  patient had a fall down deck stairs and hit R leg and R side of face. Patient denies LOC, multiple brusing of various stages of healing, on Coumadin. C/o headache and neck pain       Patient presents to be evaluated following two separate mechanical falls last week. She presents today out of concern for ecchymosis that is traveling inferiorly from the right knee sown the pretibial region. Patient does take 5 mg of Warfarin daily with the exception of a 7.5 mg dose on . Unsure of her last tetanus booster.      History provided by:  Patient   used: No            Patient History   Past Medical History:   Diagnosis Date    Blood clot in vein     Chronic kidney disease     Disease of thyroid gland     Hypertension     Seasonal allergies      Past Surgical History:   Procedure Laterality Date    COLONOSCOPY      OTHER SURGICAL HISTORY  2022    Knee surgery    OTHER SURGICAL HISTORY  2022    Finger fracture repair     Family History   Problem Relation Name Age of Onset    Hypertension Mother      Other (valvur heart disease) Mother      Hypertension Father      Heart attack Father      Breast cancer Sister       Social History     Tobacco Use    Smoking status: Former     Current packs/day: 0.00     Average packs/day: 1 pack/day for 25.0 years (25.0 ttl pk-yrs)     Types: Cigarettes     Start date: 1979     Quit date: 2004     Years since quittin.5     Passive exposure: Past    Smokeless tobacco: Never   Vaping Use    Vaping status: Never Used   Substance Use Topics    Alcohol use: Not Currently    Drug use: Never       Physical Exam   ED Triage Vitals   Temp Heart Rate Respirations BP   -- 24 1300 24 1300 24 1300    73 18 81/64      Pulse Ox Temp src Heart Rate Source Patient Position   24 1300 -- 24 1300 24  1300   98 %  Monitor Sitting      BP Location FiO2 (%)     07/19/24 1354 --     Left arm        Physical Exam  Vitals and nursing note reviewed.   Constitutional:       General: She is not in acute distress.     Appearance: Normal appearance. She is normal weight. She is not ill-appearing, toxic-appearing or diaphoretic.      Comments: When I walk in the room the patient is sitting in the bedside chair knitting. She appears well.   HENT:      Head: Normocephalic and atraumatic.      Comments: Some scattered, yellowing ecchymosis to the forehead and left face. No skull depression.      Nose: Nose normal. No rhinorrhea.   Neck:      Comments: Trachea is midline  Cardiovascular:      Rate and Rhythm: Normal rate and regular rhythm.      Heart sounds: No murmur heard.  Pulmonary:      Effort: Pulmonary effort is normal.      Breath sounds: Normal breath sounds. No wheezing.   Abdominal:      General: Abdomen is flat. Bowel sounds are normal. There is no distension.      Palpations: Abdomen is soft.      Tenderness: There is no abdominal tenderness.   Musculoskeletal:         General: No deformity. Normal range of motion.      Cervical back: Normal range of motion.   Skin:     General: Skin is warm and dry.      Findings: No rash.      Comments: Scattered abrasions over the upper and lower extremities with granulation tissue overlying. Diffuse ecchymosis to the pretibial region on the right    Neurological:      General: No focal deficit present.      Mental Status: She is alert and oriented to person, place, and time. Mental status is at baseline.   Psychiatric:         Mood and Affect: Mood normal.         Behavior: Behavior normal.         Thought Content: Thought content normal.         Judgment: Judgment normal.           ED Course & MDM   Diagnoses as of 07/19/24 1406   Bruising   Fall, initial encounter   Elevated INR                       Maria Del Rosario Coma Scale Score: 15                        Medical Decision  Making  I spoke to Florian at our Coumadin clinic who manages the patient's INR. He recommended holding the coumadin tonight, start as usual tomorrow, and follow up Monday to have her INR redrawn. Tdap was updated today as well. Instructed to return at any time for any other ongoing concerns.        Procedure  Procedures     Frank Garcia,   07/19/24 1422

## 2024-07-19 NOTE — DISCHARGE INSTRUCTIONS
Hold your coumadin tonight, restart as usual tomorrow, and go to the coumadin clinic between 10-11am on Monday to have your INR rechecked

## 2024-07-22 ENCOUNTER — ANTICOAGULATION - WARFARIN VISIT (OUTPATIENT)
Dept: PHARMACY | Facility: HOSPITAL | Age: 74
End: 2024-07-22
Payer: MEDICARE

## 2024-07-22 DIAGNOSIS — D68.61 ANTIPHOSPHOLIPID ANTIBODY SYNDROME (MULTI): Primary | ICD-10-CM

## 2024-07-22 DIAGNOSIS — I26.99 PULMONARY EMBOLISM, UNSPECIFIED CHRONICITY, UNSPECIFIED PULMONARY EMBOLISM TYPE, UNSPECIFIED WHETHER ACUTE COR PULMONALE PRESENT (MULTI): ICD-10-CM

## 2024-07-22 DIAGNOSIS — I82.4Y9 DEEP VEIN THROMBOSIS (DVT) OF PROXIMAL LOWER EXTREMITY, UNSPECIFIED CHRONICITY, UNSPECIFIED LATERALITY (MULTI): ICD-10-CM

## 2024-07-22 LAB
INR IN PPP BY COAGULATION ASSAY EXTERNAL: 2.1
PROTHROMBIN TIME (PT) IN PPP BY COAGULATION ASSAY EXTERNAL: NORMAL

## 2024-07-22 PROCEDURE — 99211 OFF/OP EST MAY X REQ PHY/QHP: CPT | Performed by: PHARMACIST

## 2024-07-22 NOTE — PROGRESS NOTES
Pt enrolled in [facility] for management of Antiphospholipid antibody syndrome (Multi) [D68.61].     Pt current location in clinic.     Current anticoagulant: Warfarin    Time since last visit: 3 weeks    Last INR: 1.9 on warfarin 35 mg in the previous week. Pt was instructed to take 37.5mgTWD at last visit.    Last Creatinine:   Lab Results   Component Value Date    CREATININE 1.20 (H) 07/03/2024     Last hemoglobin/hematocrit:  Lab Results   Component Value Date    HGB 13.7 07/03/2024     Lab Results   Component Value Date    HCT 43.7 07/03/2024       Current INR: 2.1 is therapeutic for goal range of 2.0-3.0 and is reflective of 37.5 mg in the previous week prior to visit.    Patient denies any missed doses.  Patient denies any medication changes, diet changes, or OTC/herbal supplement changes since last visit. -pt was taking tylenol  around 1,000mg a day, dosing tapering down now  Patient denies any adverse reactions or barriers.  Patient denies any CP/SOB, fatigue, bleeding or bruising since last visit. -pt fell twice xavi to the ER to get check out,   Patient denies any change in alcohol or tobacco use since last visit.   Patient denies any upcoming medical or dental procedures.    Plan:  Patient was instructed to continue with current warfarin dose.  INR follow up will occur in 2 weeks.  Patient was instructed to maintain consistent vegetable intake, to monitor for any bruising or bleeding, and to call with any medication changes or concerns.    Pt handout given with above information    INOCENCIO ALCARAZ  P:[phone]  F:[phone]

## 2024-08-07 ENCOUNTER — ANTICOAGULATION - WARFARIN VISIT (OUTPATIENT)
Dept: PHARMACY | Facility: HOSPITAL | Age: 74
End: 2024-08-07
Payer: MEDICARE

## 2024-08-07 DIAGNOSIS — D68.61 ANTIPHOSPHOLIPID ANTIBODY SYNDROME (MULTI): Primary | ICD-10-CM

## 2024-08-07 DIAGNOSIS — I82.4Y9 DEEP VEIN THROMBOSIS (DVT) OF PROXIMAL LOWER EXTREMITY, UNSPECIFIED CHRONICITY, UNSPECIFIED LATERALITY (MULTI): ICD-10-CM

## 2024-08-07 DIAGNOSIS — I26.99 PULMONARY EMBOLISM, UNSPECIFIED CHRONICITY, UNSPECIFIED PULMONARY EMBOLISM TYPE, UNSPECIFIED WHETHER ACUTE COR PULMONALE PRESENT (MULTI): ICD-10-CM

## 2024-08-07 LAB
POC INR: 2.7
POC PROTHROMBIN TIME: NORMAL

## 2024-08-07 PROCEDURE — 99211 OFF/OP EST MAY X REQ PHY/QHP: CPT

## 2024-08-07 PROCEDURE — 85610 PROTHROMBIN TIME: CPT | Mod: QW

## 2024-08-07 NOTE — PROGRESS NOTES
Pt enrolled in [facility] for management of Antiphospholipid antibody syndrome (Multi) [D68.61].     Pt current location in clinic.     Current anticoagulant: Warfarin    Time since last visit: 2 weeks    Last INR: 2.1 on warfarin 32.5 mg in the previous week. No changes were made at that time.    Last Creatinine:   Lab Results   Component Value Date    CREATININE 1.20 (H) 07/03/2024     Last hemoglobin/hematocrit:  Lab Results   Component Value Date    HGB 13.7 07/03/2024     Lab Results   Component Value Date    HCT 43.7 07/03/2024       Current INR: 2.7 is therapeutic for goal range of 2.0-3.0 and is reflective of 32.5 mg in the previous week prior to visit.    Patient denies any missed doses.  Patient denies any medication changes, diet changes, or OTC/herbal supplement changes since last visit.  Patient denies any adverse reactions or barriers.  Patient denies any CP/SOB, fatigue, bleeding or bruising since last visit.   Patient denies any change in alcohol or tobacco use since last visit.   Patient denies any upcoming medical or dental procedures.    Pt had a bruise all down her right leg that is almost gone. Her right leg still hurts at times, but overall is slowly getting better    Pt has gel shot on 9/5 with hernán puentes    Plan:  Patient was instructed to continue with current warfarin dose.  INR follow up will occur in 2 weeks.  Patient was instructed to maintain consistent vegetable intake, to monitor for any bruising or bleeding, and to call with any medication changes or concerns.    Pt handout given with above information    FANNY WAITE PharmD, [facility]   P:[phone]  F:[phone]   weakness

## 2024-08-23 ENCOUNTER — ANTICOAGULATION - WARFARIN VISIT (OUTPATIENT)
Dept: PHARMACY | Facility: HOSPITAL | Age: 74
End: 2024-08-23
Payer: MEDICARE

## 2024-08-23 DIAGNOSIS — D68.61 ANTIPHOSPHOLIPID ANTIBODY SYNDROME (MULTI): Primary | ICD-10-CM

## 2024-08-23 DIAGNOSIS — I82.4Y9 DEEP VEIN THROMBOSIS (DVT) OF PROXIMAL LOWER EXTREMITY, UNSPECIFIED CHRONICITY, UNSPECIFIED LATERALITY (MULTI): ICD-10-CM

## 2024-08-23 DIAGNOSIS — I26.99 PULMONARY EMBOLISM, UNSPECIFIED CHRONICITY, UNSPECIFIED PULMONARY EMBOLISM TYPE, UNSPECIFIED WHETHER ACUTE COR PULMONALE PRESENT (MULTI): ICD-10-CM

## 2024-08-23 LAB
POC INR: 3
POC PROTHROMBIN TIME: NORMAL

## 2024-08-23 PROCEDURE — 99211 OFF/OP EST MAY X REQ PHY/QHP: CPT | Performed by: PHARMACIST

## 2024-08-23 PROCEDURE — 85610 PROTHROMBIN TIME: CPT | Mod: QW

## 2024-09-04 ENCOUNTER — APPOINTMENT (OUTPATIENT)
Dept: ORTHOPEDIC SURGERY | Facility: CLINIC | Age: 74
End: 2024-09-04
Payer: MEDICARE

## 2024-09-04 DIAGNOSIS — M17.11 ARTHRITIS OF RIGHT KNEE: Primary | ICD-10-CM

## 2024-09-04 DIAGNOSIS — S80.11XA HEMATOMA OF RIGHT LOWER LEG: ICD-10-CM

## 2024-09-04 PROCEDURE — 1125F AMNT PAIN NOTED PAIN PRSNT: CPT | Performed by: NURSE PRACTITIONER

## 2024-09-04 PROCEDURE — 1159F MED LIST DOCD IN RCRD: CPT | Performed by: NURSE PRACTITIONER

## 2024-09-04 PROCEDURE — 1036F TOBACCO NON-USER: CPT | Performed by: NURSE PRACTITIONER

## 2024-09-04 PROCEDURE — 99214 OFFICE O/P EST MOD 30 MIN: CPT | Performed by: NURSE PRACTITIONER

## 2024-09-04 ASSESSMENT — PAIN SCALES - GENERAL: PAINLEVEL_OUTOF10: 8

## 2024-09-04 ASSESSMENT — ENCOUNTER SYMPTOMS
PSYCHIATRIC NEGATIVE: 1
ARTHRALGIAS: 1
ENDOCRINE NEGATIVE: 1
CONSTITUTIONAL NEGATIVE: 1
RESPIRATORY NEGATIVE: 1
NEUROLOGICAL NEGATIVE: 1
HEMATOLOGIC/LYMPHATIC NEGATIVE: 1
KNEE SWELLING: 1
CARDIOVASCULAR NEGATIVE: 1

## 2024-09-04 ASSESSMENT — PAIN - FUNCTIONAL ASSESSMENT: PAIN_FUNCTIONAL_ASSESSMENT: 0-10

## 2024-09-04 ASSESSMENT — PAIN DESCRIPTION - DESCRIPTORS: DESCRIPTORS: ACHING;BURNING

## 2024-09-04 NOTE — ASSESSMENT & PLAN NOTE
We reviewed conservative measures for knee OA symptom control.  Patient to continue to take Tylenol per package directions, diclofenac gel up to 4 times daily.    We discussed continued use of compression sock and knee sleeve or thigh-high compression stocking for edema control  Requesting viscosupplementation as patient has been getting good symptom control with rotation of Visco and cortisone.  Unfortunately, patient had recent injury which aggravated her symptoms.    Patient is in agreement with plan of care.  This note was generated using Dragon software. It may contain errors in wording, punctuation or spelling.

## 2024-09-04 NOTE — PROGRESS NOTES
"Subjective    Patient ID: Marine Luna is a 74 y.o. female.    Chief Complaint: Pain of the Right Knee (Patient is here for follow up after Euflexxa injections into right knee, the last dose given on 03/06/2024.  She states \"the injections helped for about 2 months.\")    Right Knee       Marine is a pleasant 74-year-old female presenting today for R knee pain, OA flare and  6 month fuv visco, 3 month since cortisone. Sx have been well controlled.  Unfortunately, the patient encountered a fall x2 on 7/17/2024.   Mod aggravate medial knee pain after fall, patient did have wound to right lower leg and swelling.  Has been gradually resolving over time.  No redness, drainage or concerns.  Patient is using a compression sock and knee compression sleeve with some improvement.  Reports the cortisone is wearing off  Continues to get good relief with biomed compounded prescription 8 E daily, has 3 refills remaining      Review of Systems   Constitutional: Negative.    HENT: Negative.     Respiratory: Negative.     Cardiovascular: Negative.    Endocrine: Negative.    Musculoskeletal:  Positive for arthralgias.   Skin: Negative.    Neurological: Negative.    Hematological: Negative.    Psychiatric/Behavioral: Negative.         Objective   Knee Musculoskeletal Exam    Inspection    Right      Edema: mild        Edema comment: Mid shin to ankle, anterior      Ecchymosis: none        Alignment: varus        Alignment comment: mild    Inspection additional comments: Small scabbed wound approximately 2 inches distal to patella, scab near surface level.  There is no surrounding erythema, swelling wound is dry and nontender to touch.  There is mild to moderate lower leg edema, consistent with healing hematoma.  Skin is pink, warm, soft and easily compressible.    Palpation    Right      Right knee palpation is unremarkable.      Range of Motion    Right      Active extension: 0      Active flexion: 110     Instability    " Right      Instability signs: none - stable    Neurovascular    Right      Pulses - DP: normal      Capillary refill: brisk and well-perfused    Neurovascular additional comments: Distal motor and sensory intact, cap refill at 2 seconds.      Image Results:    4/20/2023 L knee  FINDINGS:  November 16, 2020 Advanced osteoarthritis right knee mostly medial  compartment. No fracture seen. No osseous lesions.     IMPRESSION:  Advanced osteoarthritis right knee particularly in the medial  compartment.    MDM: Requesting viscosupplementation from the patient's insurance provider, pending allowable product. Order to be placed once response from insurance company received.  Alternative interventions include OTC medications, topical diclofenac gel, PT, bracing. Cortisone injection less effective over time.     Assessment/Plan   Encounter Diagnoses:  Problem List Items Addressed This Visit             ICD-10-CM    Arthritis of right knee - Primary M17.11     We reviewed conservative measures for knee OA symptom control.  Patient to continue to take Tylenol per package directions, diclofenac gel up to 4 times daily.    We discussed continued use of compression sock and knee sleeve or thigh-high compression stocking for edema control  Requesting viscosupplementation as patient has been getting good symptom control with rotation of Visco and cortisone.  Unfortunately, patient had recent injury which aggravated her symptoms.    Patient is in agreement with plan of care.  This note was generated using Dragon software. It may contain errors in wording, punctuation or spelling.            Hematoma of right lower leg S80.11XA

## 2024-09-05 ENCOUNTER — TELEPHONE (OUTPATIENT)
Dept: ORTHOPEDIC SURGERY | Facility: CLINIC | Age: 74
End: 2024-09-05

## 2024-09-05 ENCOUNTER — APPOINTMENT (OUTPATIENT)
Dept: ORTHOPEDIC SURGERY | Facility: CLINIC | Age: 74
End: 2024-09-05
Payer: MEDICARE

## 2024-09-05 NOTE — TELEPHONE ENCOUNTER
PIO FOR PATIENT LETTING HER KNOW THAT HER GEL INJECTIONS HAVE BEEN APPROVED THROUGH HER INSURANCE AND TO CALL THE OFFICE TO GET THEM SCHEDULED

## 2024-09-09 ENCOUNTER — APPOINTMENT (OUTPATIENT)
Dept: ORTHOPEDIC SURGERY | Facility: CLINIC | Age: 74
End: 2024-09-09
Payer: MEDICARE

## 2024-09-09 ENCOUNTER — HOSPITAL ENCOUNTER (OUTPATIENT)
Dept: RADIOLOGY | Facility: EXTERNAL LOCATION | Age: 74
Discharge: HOME | End: 2024-09-09

## 2024-09-09 DIAGNOSIS — M17.11 ARTHRITIS OF RIGHT KNEE: Primary | ICD-10-CM

## 2024-09-09 PROCEDURE — 1159F MED LIST DOCD IN RCRD: CPT | Performed by: NURSE PRACTITIONER

## 2024-09-09 PROCEDURE — 1036F TOBACCO NON-USER: CPT | Performed by: NURSE PRACTITIONER

## 2024-09-09 PROCEDURE — 1125F AMNT PAIN NOTED PAIN PRSNT: CPT | Performed by: NURSE PRACTITIONER

## 2024-09-09 PROCEDURE — 20611 DRAIN/INJ JOINT/BURSA W/US: CPT | Performed by: NURSE PRACTITIONER

## 2024-09-09 ASSESSMENT — ENCOUNTER SYMPTOMS
RESPIRATORY NEGATIVE: 1
CONSTITUTIONAL NEGATIVE: 1
CARDIOVASCULAR NEGATIVE: 1
HEMATOLOGIC/LYMPHATIC NEGATIVE: 1
NEUROLOGICAL NEGATIVE: 1
PSYCHIATRIC NEGATIVE: 1
ARTHRALGIAS: 1
ENDOCRINE NEGATIVE: 1

## 2024-09-09 ASSESSMENT — PAIN - FUNCTIONAL ASSESSMENT: PAIN_FUNCTIONAL_ASSESSMENT: 0-10

## 2024-09-09 ASSESSMENT — PAIN DESCRIPTION - DESCRIPTORS: DESCRIPTORS: ACHING

## 2024-09-09 ASSESSMENT — PAIN SCALES - GENERAL: PAINLEVEL_OUTOF10: 6

## 2024-09-09 NOTE — PROGRESS NOTES
Subjective    Patient ID: Marine Luna is a 74 y.o. female.    Chief Complaint:   Chief Complaint   Patient presents with    Right Knee - Injections     Patient is here for her 1st of 3 EUFLEXXA Injections into her right knee.     HPI:    Marine is a pleasant 74-year-old female presenting today for approved viscosupplementation injection for R knee pain, OA flare.  Patient with good symptom relief with as needed Tylenol and topical diclofenac gel. Increased stiffness and pain, no inew injuries or falls  Good effect with Visco supplementation in the past.  No new injuries, INR checked 2 weeks ago, 3.0    Review of Systems   Constitutional: Negative.    HENT: Negative.     Respiratory: Negative.     Cardiovascular: Negative.    Endocrine: Negative.    Musculoskeletal:  Positive for arthralgias.   Skin: Negative.    Neurological: Negative.    Hematological: Negative.    Psychiatric/Behavioral: Negative.         Objective   Right Knee Exam     Tenderness   The patient is experiencing tenderness in the medial joint line.    Range of Motion   Extension:  0   Flexion:  120     Tests   Malika:  Medial - negative Lateral - negative  Varus: negative Valgus: negative  Lachman:  Anterior - negative      Drawer:  Anterior - negative    Posterior - negative    Other   Sensation: normal  Pulse: present  Swelling: mild    Comments:  Mild edema to medial aspect of the knee .   Full ROM of distal joints with no sx aggravation distal motor and sensory intact, cap refill at 2 seconds.  Mild aggravation of medial knee discomfort with testing, no laxity noted.      Image Results:    4/20/2023 L knee  FINDINGS:  November 16, 2020 Advanced osteoarthritis right knee mostly medial  compartment. No fracture seen. No osseous lesions.     IMPRESSION:  Advanced osteoarthritis right knee particularly in the medial  compartment.      Lab Results   Component Value Date    INR 3.00 08/23/2024    INR 2.70 08/07/2024    INR 2.10 07/22/2024     PROTIME 43.0 (H) 07/19/2024    PROTIME 27.3 (H) 09/05/2023      Patient ID: Marine Luna is a 74 y.o. female.    L Inj/Asp: R knee on 9/9/2024 10:39 AM  Indications: pain and joint swelling  Details: 22 G needle, ultrasound-guided superolateral approach  Medications: 20 mg sodium hyaluronate 10 mg/mL(mw 2.4 -3.6 million)  Outcome: tolerated well, no immediate complications    Patient wishes to proceed via verbal consent.  Skin was prepped with Betadine, Vapocoolant spray and alcohol.  Direct visualization using high-frequency linear probe of ultrasound was utilized to administer the injection of Euflexxa #1/3.   Images were saved under MRN number into the PACS system.   Bandaid to site post injection, no bleeding.      Procedure, treatment alternatives, risks and benefits explained, specific risks discussed. Consent was given by the patient. Immediately prior to procedure a time out was called to verify the correct patient, procedure, equipment, support staff and site/side marked as required. Patient was prepped and draped in the usual sterile fashion.         Lab Results   Component Value Date    INR 3.00 08/23/2024    INR 2.70 08/07/2024    INR 2.10 07/22/2024    PROTIME 43.0 (H) 07/19/2024    PROTIME 27.3 (H) 09/05/2023        Assessment/Plan   Encounter Diagnoses:  Problem List Items Addressed This Visit             ICD-10-CM    Arthritis of right knee M17.11     We reviewed conservative measures for knee OA symptom control.  Patient to continue to take Tylenol per package directions, diclofenac gel up to 4 times daily.  Patient to perform light activity today, gradually resume normal activities tomorrow and increase as tolerated.  Plan will be to follow-up in 1 week for injection #2 of 3 of the Euflexxa series.  Patient is in agreement with plan of care.  This note was generated using Dragon software. It may contain errors in wording, punctuation or spelling.          Relevant Orders    Point of Care  Ultrasound (Completed)

## 2024-09-16 ENCOUNTER — HOSPITAL ENCOUNTER (OUTPATIENT)
Dept: RADIOLOGY | Facility: EXTERNAL LOCATION | Age: 74
Discharge: HOME | End: 2024-09-16

## 2024-09-16 ENCOUNTER — APPOINTMENT (OUTPATIENT)
Dept: ORTHOPEDIC SURGERY | Facility: CLINIC | Age: 74
End: 2024-09-16
Payer: MEDICARE

## 2024-09-16 DIAGNOSIS — M17.11 ARTHRITIS OF RIGHT KNEE: Primary | ICD-10-CM

## 2024-09-16 PROCEDURE — 1036F TOBACCO NON-USER: CPT | Performed by: NURSE PRACTITIONER

## 2024-09-16 PROCEDURE — 1159F MED LIST DOCD IN RCRD: CPT | Performed by: NURSE PRACTITIONER

## 2024-09-16 PROCEDURE — 20611 DRAIN/INJ JOINT/BURSA W/US: CPT | Performed by: NURSE PRACTITIONER

## 2024-09-16 ASSESSMENT — ENCOUNTER SYMPTOMS
CONSTITUTIONAL NEGATIVE: 1
ENDOCRINE NEGATIVE: 1
PSYCHIATRIC NEGATIVE: 1
ARTHRALGIAS: 1
HEMATOLOGIC/LYMPHATIC NEGATIVE: 1
CARDIOVASCULAR NEGATIVE: 1
NEUROLOGICAL NEGATIVE: 1
RESPIRATORY NEGATIVE: 1

## 2024-09-16 ASSESSMENT — PAIN SCALES - GENERAL: PAINLEVEL_OUTOF10: 5 - MODERATE PAIN

## 2024-09-16 ASSESSMENT — PAIN - FUNCTIONAL ASSESSMENT: PAIN_FUNCTIONAL_ASSESSMENT: 0-10

## 2024-09-16 ASSESSMENT — PAIN DESCRIPTION - DESCRIPTORS: DESCRIPTORS: ACHING

## 2024-09-16 NOTE — PROGRESS NOTES
Subjective    Patient ID: Marine Luna is a 74 y.o. female.    Chief Complaint:   Chief Complaint   Patient presents with    Right Knee - Injections     Patient is here for her 2ND of 3 EUFLEXXA Injections into her right knee.     HPI:    Marine is a pleasant 74-year-old female presenting today for  viscosupplementation injection Euflexxa #2/3 for R knee pain, OA flare.  Patient with good symptom relief with as needed Tylenol and topical diclofenac gel. Increased stiffness and pain, no inew injuries or falls  Good effect with Visco supplementation in the past.  No new injuries, INR checked 2 weeks ago, 3.0  + improvement in knee pain since last week's injection    Review of Systems   Constitutional: Negative.    HENT: Negative.     Respiratory: Negative.     Cardiovascular: Negative.    Endocrine: Negative.    Musculoskeletal:  Positive for arthralgias.   Skin: Negative.    Neurological: Negative.    Hematological: Negative.    Psychiatric/Behavioral: Negative.         Objective   Right Knee Exam     Tenderness   The patient is experiencing tenderness in the medial joint line.    Range of Motion   Extension:  0   Flexion:  120     Tests   Malika:  Medial - negative Lateral - negative  Varus: negative Valgus: negative  Lachman:  Anterior - negative      Drawer:  Anterior - negative    Posterior - negative    Other   Sensation: normal  Pulse: present  Swelling: mild    Comments:  Mild edema to medial aspect of the knee .   Full ROM of distal joints with no sx aggravation distal motor and sensory intact, cap refill at 2 seconds.  Mild aggravation of medial knee discomfort with testing, no laxity noted.      Image Results:    4/20/2023 L knee  FINDINGS:  November 16, 2020 Advanced osteoarthritis right knee mostly medial  compartment. No fracture seen. No osseous lesions.     IMPRESSION:  Advanced osteoarthritis right knee particularly in the medial  compartment.      Lab Results   Component Value Date    INR  3.00 08/23/2024    INR 2.70 08/07/2024    INR 2.10 07/22/2024    PROTIME 43.0 (H) 07/19/2024    PROTIME 27.3 (H) 09/05/2023      Patient ID: Marine Luna is a 74 y.o. female.    L Inj/Asp: R knee on 9/16/2024 1:47 PM  Indications: pain and joint swelling  Details: 22 G needle, ultrasound-guided superolateral approach  Medications: 20 mg sodium hyaluronate 10 mg/mL(mw 2.4 -3.6 million)  Outcome: tolerated well, no immediate complications    Reviewed risks/benefits of injection prior to proceeding. Skin was prepped with Betadine, Vapocoolant spray and alcohol.  Direct visualization using high-frequency linear probe of ultrasound was utilized to administer the injection of Euflexxa #2/3.   Images were saved under MRN number into the PACS system.   Bandaid to site post injection, no bleeding.      Procedure, treatment alternatives, risks and benefits explained, specific risks discussed. Consent was given by the patient. Immediately prior to procedure a time out was called to verify the correct patient, procedure, equipment, support staff and site/side marked as required. Patient was prepped and draped in the usual sterile fashion.         Lab Results   Component Value Date    INR 3.00 08/23/2024    INR 2.70 08/07/2024    INR 2.10 07/22/2024    PROTIME 43.0 (H) 07/19/2024    PROTIME 27.3 (H) 09/05/2023        Assessment/Plan   Encounter Diagnoses:  Problem List Items Addressed This Visit             ICD-10-CM    Arthritis of right knee M17.11     We reviewed conservative measures for knee OA symptom control.  Patient to continue to take Tylenol per package directions, diclofenac gel up to 4 times daily.  Patient to perform light activity today, gradually resume normal activities tomorrow and increase as tolerated.  Plan will be to follow-up in 1 week for injection #3 of 3 of the Euflexxa series.  Patient is in agreement with plan of care.  This note was generated using Dragon software. It may contain errors in  wording, punctuation or spelling.          Relevant Orders    Point of Care Ultrasound (Completed)

## 2024-09-18 ENCOUNTER — ANTICOAGULATION - WARFARIN VISIT (OUTPATIENT)
Dept: PHARMACY | Facility: HOSPITAL | Age: 74
End: 2024-09-18
Payer: MEDICARE

## 2024-09-18 VITALS — HEART RATE: 59 BPM | DIASTOLIC BLOOD PRESSURE: 71 MMHG | SYSTOLIC BLOOD PRESSURE: 130 MMHG

## 2024-09-18 DIAGNOSIS — D68.61 ANTIPHOSPHOLIPID ANTIBODY SYNDROME (MULTI): Primary | ICD-10-CM

## 2024-09-18 DIAGNOSIS — I26.99 PULMONARY EMBOLISM, UNSPECIFIED CHRONICITY, UNSPECIFIED PULMONARY EMBOLISM TYPE, UNSPECIFIED WHETHER ACUTE COR PULMONALE PRESENT (MULTI): ICD-10-CM

## 2024-09-18 DIAGNOSIS — I82.4Y9 DEEP VEIN THROMBOSIS (DVT) OF PROXIMAL LOWER EXTREMITY, UNSPECIFIED CHRONICITY, UNSPECIFIED LATERALITY (MULTI): ICD-10-CM

## 2024-09-18 LAB
POC INR: 1.1
POC PROTHROMBIN TIME: NORMAL

## 2024-09-18 PROCEDURE — 99211 OFF/OP EST MAY X REQ PHY/QHP: CPT | Performed by: PHARMACIST

## 2024-09-18 PROCEDURE — 85610 PROTHROMBIN TIME: CPT | Mod: QW

## 2024-09-18 NOTE — PROGRESS NOTES
Pt enrolled in [facility] for management of Antiphospholipid antibody syndrome (Multi) [D68.61].     Pt current location in clinic.     Current anticoagulant: Warfarin    Time since last visit: 4 weeks    Last INR: 3.0 on warfarin 37.5 mg in the previous week. Pt was instructed to take 2.5mg once, then resume usual dosing at last visit.    Last Creatinine:   Lab Results   Component Value Date    CREATININE 1.20 (H) 07/03/2024     Last hemoglobin/hematocrit:  Lab Results   Component Value Date    HGB 13.7 07/03/2024     Lab Results   Component Value Date    HCT 43.7 07/03/2024       Current INR: 1.1 is SUBtherapeutic for goal range of 2.0-3.0 and is reflective of 37.5 mg in the previous week prior to visit. - She does use a pill planner.    Dosing confirmed with patient - took 7.5mg yesterday thinking it was Wednesday  Patient denies any missed doses - she will check her pill planner to make sure medication in loaded. However, she notes that her carvedilol dosing did get messed up as she is to take 3.125mg but filled 6.25mg and did not take 1/2 tabs. BP ok today in clinic  Patient denies any medication changes, diet changes, or OTC/herbal supplement changes since last visit.  Patient denies any adverse reactions or barriers.  Patient denies any CP/SOB, fatigue, bleeding or bruising since last visit.   Patient denies any change in alcohol or tobacco use since last visit.   Patient denies any upcoming medical or dental procedures.    Plan:  Patient was instructed to  take 10mg today, 7.5mg tomorrow, then resume usual dosing.  INR follow up will occur in 5 days.  Patient was instructed to maintain consistent vegetable intake, to monitor for any bruising or bleeding, and to call with any medication changes or concerns.    Pt handout given with above information    Vick Tabares, PharmD  P:[phone]  F:[phone]

## 2024-09-23 ENCOUNTER — APPOINTMENT (OUTPATIENT)
Dept: ORTHOPEDIC SURGERY | Facility: CLINIC | Age: 74
End: 2024-09-23
Payer: MEDICARE

## 2024-09-23 ENCOUNTER — ANTICOAGULATION - WARFARIN VISIT (OUTPATIENT)
Dept: PHARMACY | Facility: HOSPITAL | Age: 74
End: 2024-09-23
Payer: MEDICARE

## 2024-09-23 ENCOUNTER — HOSPITAL ENCOUNTER (OUTPATIENT)
Dept: RADIOLOGY | Facility: EXTERNAL LOCATION | Age: 74
Discharge: HOME | End: 2024-09-23

## 2024-09-23 DIAGNOSIS — M17.11 ARTHRITIS OF RIGHT KNEE: Primary | ICD-10-CM

## 2024-09-23 DIAGNOSIS — Z86.711 PERSONAL HISTORY OF PE (PULMONARY EMBOLISM): Primary | Chronic | ICD-10-CM

## 2024-09-23 DIAGNOSIS — I82.4Y9 DEEP VEIN THROMBOSIS (DVT) OF PROXIMAL LOWER EXTREMITY, UNSPECIFIED CHRONICITY, UNSPECIFIED LATERALITY (MULTI): Chronic | ICD-10-CM

## 2024-09-23 DIAGNOSIS — D68.61 ANTIPHOSPHOLIPID ANTIBODY SYNDROME (MULTI): Chronic | ICD-10-CM

## 2024-09-23 LAB
POC INR: 2.6
POC PROTHROMBIN TIME: NORMAL

## 2024-09-23 PROCEDURE — 85610 PROTHROMBIN TIME: CPT | Mod: QW

## 2024-09-23 PROCEDURE — 20611 DRAIN/INJ JOINT/BURSA W/US: CPT | Performed by: NURSE PRACTITIONER

## 2024-09-23 PROCEDURE — 99211 OFF/OP EST MAY X REQ PHY/QHP: CPT | Performed by: PHARMACIST

## 2024-09-23 PROCEDURE — 1159F MED LIST DOCD IN RCRD: CPT | Performed by: NURSE PRACTITIONER

## 2024-09-23 PROCEDURE — 1125F AMNT PAIN NOTED PAIN PRSNT: CPT | Performed by: NURSE PRACTITIONER

## 2024-09-23 PROCEDURE — 1036F TOBACCO NON-USER: CPT | Performed by: NURSE PRACTITIONER

## 2024-09-23 PROCEDURE — 99214 OFFICE O/P EST MOD 30 MIN: CPT | Performed by: NURSE PRACTITIONER

## 2024-09-23 ASSESSMENT — ENCOUNTER SYMPTOMS
CONSTITUTIONAL NEGATIVE: 1
ENDOCRINE NEGATIVE: 1
HEMATOLOGIC/LYMPHATIC NEGATIVE: 1
RESPIRATORY NEGATIVE: 1
ARTHRALGIAS: 1
NEUROLOGICAL NEGATIVE: 1
CARDIOVASCULAR NEGATIVE: 1
PSYCHIATRIC NEGATIVE: 1

## 2024-09-23 ASSESSMENT — PAIN DESCRIPTION - DESCRIPTORS: DESCRIPTORS: ACHING

## 2024-09-23 ASSESSMENT — PAIN - FUNCTIONAL ASSESSMENT: PAIN_FUNCTIONAL_ASSESSMENT: 0-10

## 2024-09-23 ASSESSMENT — PAIN SCALES - GENERAL: PAINLEVEL_OUTOF10: 3

## 2024-09-23 NOTE — PROGRESS NOTES
Pt enrolled in [facility] for management of Personal history of PE (pulmonary embolism) [Z86.711].     Pt current location in clinic.     Current anticoagulant: Warfarin    Time since last visit: 1 weeks    Last INR: 1.1 on warfarin 40 mg in the previous week. Pt was instructed to take 10mg once, 7.5mg once, then resume usual dosing at last visit.    Last Creatinine:   Lab Results   Component Value Date    CREATININE 1.20 (H) 07/03/2024     Last hemoglobin/hematocrit:  Lab Results   Component Value Date    HGB 13.7 07/03/2024     Lab Results   Component Value Date    HCT 43.7 07/03/2024       Current INR: 2.6 is therapeutic for goal range of 2.0-3.0 and is reflective of 45 mg in the previous week prior to visit.    Dosing confirmed with patient  Patient denies any missed doses.  Patient denies any medication changes, diet changes, or OTC/herbal supplement changes since last visit.  Patient denies any adverse reactions or barriers.  Patient denies any CP/SOB, fatigue, bleeding or bruising since last visit.   Patient denies any change in alcohol or tobacco use since last visit.   Patient denies any upcoming medical or dental procedures.    Plan:  Patient was instructed to continue with current warfarin dose.  INR follow up will occur in 2 weeks.  Patient was instructed to maintain consistent vegetable intake, to monitor for any bruising or bleeding, and to call with any medication changes or concerns.    Pt handout given with above information    Vick Tabares, PharmD  P:[phone]  F:[phone]

## 2024-09-23 NOTE — PROGRESS NOTES
Subjective    Patient ID: Marine Luna is a 74 y.o. female.    Chief Complaint:   No chief complaint on file.    HPI:    Marine is a pleasant 74-year-old female presenting today for  viscosupplementation injection Euflexxa #3/3 for R knee pain, OA flare.  Patient with good symptom relief with as needed Tylenol and topical diclofenac gel. Increased stiffness and pain, no inew injuries or falls  Good effect with Visco supplementation in the past.  No new injuries, INR checked 3 weeks ago, 3.0  + improvement Over the last 2 weeks with viscosupplementation.  Patient states improved range of motion, decreased pain and resumed most normal activities.  Patient is using compression knee sleeve with good effect.  Patient with recent contusion of the right lower leg which is significantly improving over the last couple of weeks as well.    Review of Systems   Constitutional: Negative.    HENT: Negative.     Respiratory: Negative.     Cardiovascular: Negative.    Endocrine: Negative.    Musculoskeletal:  Positive for arthralgias.   Skin: Negative.    Neurological: Negative.    Hematological: Negative.    Psychiatric/Behavioral: Negative.         Objective   Right Knee Exam     Tenderness   The patient is experiencing Minimal tenderness in the medial joint line.    Range of Motion   Extension:  0   Flexion:  120     Tests   Malika:  Medial - negative Lateral - negative  Varus: negative Valgus: negative  Lachman:  Anterior - negative      Drawer:  Anterior - negative    Posterior - negative    Other   Sensation: normal  Pulse: present  Swelling: mild    Comments:  Mild edema to medial aspect of the knee .   Full ROM of distal joints with no sx aggravation distal motor and sensory intact, cap refill at 2 seconds.  Mild aggravation of medial knee discomfort with testing, no laxity noted.  Decrease in hematoma of mid anterior shin, improvement in color with slight bruising remaining.      Image Results:    4/20/2023 L  knee  FINDINGS:  November 16, 2020 Advanced osteoarthritis right knee mostly medial  compartment. No fracture seen. No osseous lesions.     IMPRESSION:  Advanced osteoarthritis right knee particularly in the medial  compartment.      Lab Results   Component Value Date    INR 2.60 09/23/2024    INR 1.10 09/18/2024    INR 3.00 08/23/2024    PROTIME 43.0 (H) 07/19/2024    PROTIME 27.3 (H) 09/05/2023      Patient ID: Marine Luna is a 74 y.o. female.    L Inj/Asp: R knee on 9/23/2024 12:52 PM  Indications: pain and joint swelling  Details: 22 G needle, ultrasound-guided superolateral approach  Medications: 20 mg sodium hyaluronate 10 mg/mL(mw 2.4 -3.6 million)  Outcome: tolerated well, no immediate complications    Reviewed risks/benefits of injection prior to proceeding. Skin was prepped with Betadine, Vapocoolant spray and alcohol.  Direct visualization using high-frequency linear probe of ultrasound was utilized to administer the injection of Euflexxa #3/3.   Images were saved under MRN number into the PACS system.   Bandaid to site post injection, no bleeding.      Procedure, treatment alternatives, risks and benefits explained, specific risks discussed. Consent was given by the patient. Immediately prior to procedure a time out was called to verify the correct patient, procedure, equipment, support staff and site/side marked as required. Patient was prepped and draped in the usual sterile fashion.       Lab Results   Component Value Date    INR 2.60 09/23/2024    INR 1.10 09/18/2024    INR 3.00 08/23/2024    PROTIME 43.0 (H) 07/19/2024    PROTIME 27.3 (H) 09/05/2023        Assessment/Plan   Encounter Diagnoses:  Problem List Items Addressed This Visit             ICD-10-CM    Arthritis of right knee - Primary M17.11     We reviewed conservative measures for knee OA symptom control.  Patient to continue to take Tylenol per package directions, diclofenac gel up to 4 times daily.  Patient to perform light  activity today, gradually resume normal activities tomorrow and increase as tolerated.  Plan will be to follow-up in approximately 3 months, sooner for changes or concerns.  Continue to use knee sleeve on as needed basis  Patient is in agreement with plan of care.  This note was generated using Dragon software. It may contain errors in wording, punctuation or spelling.          Relevant Orders    Point of Care Ultrasound (Completed)    Follow Up In Orthopaedic Surgery

## 2024-09-23 NOTE — ASSESSMENT & PLAN NOTE
We reviewed conservative measures for knee OA symptom control.  Patient to continue to take Tylenol per package directions, diclofenac gel up to 4 times daily.  Patient to perform light activity today, gradually resume normal activities tomorrow and increase as tolerated.  Plan will be to follow-up in approximately 3 months, sooner for changes or concerns.  Continue to use knee sleeve on as needed basis  Patient is in agreement with plan of care.  This note was generated using Dragon software. It may contain errors in wording, punctuation or spelling.

## 2024-10-09 ENCOUNTER — APPOINTMENT (OUTPATIENT)
Dept: PHARMACY | Facility: HOSPITAL | Age: 74
End: 2024-10-09
Payer: MEDICARE

## 2024-10-16 ENCOUNTER — ANTICOAGULATION - WARFARIN VISIT (OUTPATIENT)
Dept: PHARMACY | Facility: HOSPITAL | Age: 74
End: 2024-10-16
Payer: MEDICARE

## 2024-10-16 ENCOUNTER — OFFICE VISIT (OUTPATIENT)
Dept: CARDIOLOGY | Facility: CLINIC | Age: 74
End: 2024-10-16
Payer: COMMERCIAL

## 2024-10-16 VITALS
HEART RATE: 69 BPM | SYSTOLIC BLOOD PRESSURE: 124 MMHG | HEIGHT: 64 IN | DIASTOLIC BLOOD PRESSURE: 60 MMHG | WEIGHT: 188.9 LBS | BODY MASS INDEX: 32.25 KG/M2 | OXYGEN SATURATION: 98 %

## 2024-10-16 DIAGNOSIS — I82.4Y9 DEEP VEIN THROMBOSIS (DVT) OF PROXIMAL LOWER EXTREMITY, UNSPECIFIED CHRONICITY, UNSPECIFIED LATERALITY (MULTI): ICD-10-CM

## 2024-10-16 DIAGNOSIS — D68.61 ANTIPHOSPHOLIPID ANTIBODY SYNDROME (MULTI): Primary | ICD-10-CM

## 2024-10-16 DIAGNOSIS — I26.99 PULMONARY EMBOLISM, UNSPECIFIED CHRONICITY, UNSPECIFIED PULMONARY EMBOLISM TYPE, UNSPECIFIED WHETHER ACUTE COR PULMONALE PRESENT (MULTI): ICD-10-CM

## 2024-10-16 DIAGNOSIS — E78.5 HYPERLIPIDEMIA, UNSPECIFIED HYPERLIPIDEMIA TYPE: ICD-10-CM

## 2024-10-16 DIAGNOSIS — E66.01 MORBID (SEVERE) OBESITY DUE TO EXCESS CALORIES (MULTI): ICD-10-CM

## 2024-10-16 DIAGNOSIS — I73.9 PAD (PERIPHERAL ARTERY DISEASE) (CMS-HCC): ICD-10-CM

## 2024-10-16 LAB
POC INR: 2.7
POC PROTHROMBIN TIME: NORMAL

## 2024-10-16 PROCEDURE — 1036F TOBACCO NON-USER: CPT | Performed by: STUDENT IN AN ORGANIZED HEALTH CARE EDUCATION/TRAINING PROGRAM

## 2024-10-16 PROCEDURE — 85610 PROTHROMBIN TIME: CPT | Mod: QW

## 2024-10-16 PROCEDURE — 99214 OFFICE O/P EST MOD 30 MIN: CPT | Performed by: STUDENT IN AN ORGANIZED HEALTH CARE EDUCATION/TRAINING PROGRAM

## 2024-10-16 PROCEDURE — 3008F BODY MASS INDEX DOCD: CPT | Performed by: STUDENT IN AN ORGANIZED HEALTH CARE EDUCATION/TRAINING PROGRAM

## 2024-10-16 PROCEDURE — 99211 OFF/OP EST MAY X REQ PHY/QHP: CPT

## 2024-10-16 NOTE — PROGRESS NOTES
Pt enrolled in [facility] for management of Antiphospholipid antibody syndrome (Multi) [D68.61].     Pt current location in clinic.     Current anticoagulant: Warfarin    Time since last visit: 3 weeks    Last INR: 2.60 on warfarin 37.5 mg in the previous week. No changes were made at that time.    Last Creatinine:   Lab Results   Component Value Date    CREATININE 1.20 (H) 07/03/2024     Last hemoglobin/hematocrit:  Lab Results   Component Value Date    HGB 13.7 07/03/2024     Lab Results   Component Value Date    HCT 43.7 07/03/2024       Current INR: 2.70 is therapeutic for goal range of 2.0-3.0 and is reflective of 37.5 mg in the previous week prior to visit.    Dosing confirmed with patient  Patient denies any missed doses.  Patient denies any medication changes or diet changes since last visit. She does note she is taking the WEEM supplement (hair, skin & nails) once daily.  Patient denies any adverse reactions or barriers.  Patient denies any CP/SOB, fatigue, bleeding or bruising since last visit.   Patient denies any change in alcohol or tobacco use since last visit.   Patient denies any upcoming medical or dental procedures.    Plan:  Patient was instructed to continue with current warfarin dose.  INR follow up will occur in 4 weeks.  Patient was instructed to maintain consistent vegetable intake, to monitor for any bruising or bleeding, and to call with any medication changes or concerns.    Pt handout given with above information    Papo Holden, MicheleD BCPS  P:[phone]  F:[phone]

## 2024-10-16 NOTE — PROGRESS NOTES
Chief complaint:   Chief Complaint   Patient presents with    1 year f/u        History of Present Illness  Marine Luna is a 74 y.o. 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     She participated in PAD rehab which she reports helped her walking capacity a lot.     She had a mechanical fall in mid July that resulted in some bruising. Denies any bleeding issues.  When does one flight of stairs feels short of breath and leg fatigue, not new. Denies claudication, rest pain or tissue loss.      Social History     Tobacco Use    Smoking status: Former     Current packs/day: 0.00     Average packs/day: 1 pack/day for 25.0 years (25.0 ttl pk-yrs)     Types: Cigarettes     Start date: 1979     Quit date: 2004     Years since quittin.8     Passive exposure: Past    Smokeless tobacco: Never   Vaping Use    Vaping status: Never Used   Substance Use Topics    Alcohol use: Not Currently    Drug use: Never       Outpatient Medications:  Current Outpatient Medications   Medication Instructions    aspirin 81 mg EC tablet 1 tablet, Daily    buPROPion SR (WELLBUTRIN SR) 150 mg, oral, Daily    calcium carbonate-vitamin D3 600 mg-5 mcg (200 unit) tablet 1 tablet, Daily    carbidopa-levodopa (Sinemet)  mg tablet 1 tablet, oral, 3 times daily    carvedilol (COREG) 3.125 mg, oral, 2 times daily (morning and late afternoon)    cholecalciferol (VITAMIN D-3) 25 mcg, 2 times daily    cilostazol (PLETAL) 50 mg, oral, 2 times daily    coenzyme Q-10 100 mg, Daily    Coreg 3.125 mg, oral, 2 times daily (morning and late afternoon)    docosahexaenoic acid/epa (FISH OIL ORAL) Take 1 capsule by mouth early in the morning..    ezetimibe (ZETIA) 10 mg, oral,  Daily    fexofenadine HCl (ALLEGRA ORAL) 1 tablet, Daily    FLUoxetine (PROZAC) 20 mg, oral, Daily    levothyroxine (SYNTHROID, LEVOXYL) 50 mcg, oral, Daily    losartan (COZAAR) 25 mg, oral, Daily    rosuvastatin (CRESTOR) 2.5 mg, oral, Daily    valACYclovir (VALTREX) 1,000 mg, oral, Daily    warfarin (Coumadin) 5 mg tablet Take 1 and 1/2 tablets (7.5 mg) by mouth daily or as directed by anticoagulation clinic.         Vitals:  Vitals:    10/16/24 1424   BP: 124/60   Pulse: 69   SpO2: 98%       Physical Exam:  General: NAD, well-appearing  HEENT: moist mucous membranes, no jaundice  Neck: No JVD, no carotid bruit  Lungs: CTA kendra, no wheezing or rales  Cardiac: RRR, no murmurs  Abdomen: soft, non-tender, non-distended  Extremities: 2+ radial pulses, no edema  Skin: warm, dry, no wound  Neurologic: AAOx3,  no focal deficits          Assessment/Plan       # APLA, PAD, DVT/PE  -Currently on Coumadin, INR has been therapeutic  -Exercise ABIs without significant disease. Continue medical therapy with anticoagulation  -Continue compression stockings for minimal leg swelling  -Echocardiogram with normal RV size and function    #Dyslipidemia  -LDL not at goal  -Reports she can only tolerate crestor 5mg every other day. Also on ezetimibe  -Recheck lipid panel        Ann-Marie Gates MD [facility]  Interventional Cardiology  Endovascular Interventions  [email or web address]    **Disclaimer: This note was dictated by speech recognition, and every effort has been made to prevent any error in transcription, however minor errors may be present**

## 2024-10-17 ENCOUNTER — TELEPHONE (OUTPATIENT)
Dept: CARDIOLOGY | Facility: CLINIC | Age: 74
End: 2024-10-17
Payer: MEDICARE

## 2024-10-17 NOTE — TELEPHONE ENCOUNTER
L/M for pt to call back.    ----- Message from Ann-Marie Heck sent at 10/16/2024  6:34 PM EDT -----  Regarding: lipid panel  Please let patient know that I want her to get a fasting lipid panel checked whenever she can get it done  tx

## 2024-10-23 ENCOUNTER — APPOINTMENT (OUTPATIENT)
Dept: CARDIOLOGY | Facility: CLINIC | Age: 74
End: 2024-10-23
Payer: MEDICARE

## 2024-11-01 ENCOUNTER — LAB (OUTPATIENT)
Dept: LAB | Facility: LAB | Age: 74
End: 2024-11-01
Payer: MEDICARE

## 2024-11-01 DIAGNOSIS — E78.5 HYPERLIPIDEMIA, UNSPECIFIED HYPERLIPIDEMIA TYPE: ICD-10-CM

## 2024-11-01 LAB
CHOLEST SERPL-MCNC: 189 MG/DL (ref 0–199)
CHOLESTEROL/HDL RATIO: 2.7
HDLC SERPL-MCNC: 71 MG/DL
LDLC SERPL CALC-MCNC: 89 MG/DL
NON HDL CHOLESTEROL: 118 MG/DL (ref 0–149)
TRIGL SERPL-MCNC: 143 MG/DL (ref 0–149)
VLDL: 29 MG/DL (ref 0–40)

## 2024-11-01 PROCEDURE — 36415 COLL VENOUS BLD VENIPUNCTURE: CPT

## 2024-11-01 PROCEDURE — 80061 LIPID PANEL: CPT

## 2024-11-13 ENCOUNTER — ANTICOAGULATION - WARFARIN VISIT (OUTPATIENT)
Dept: PHARMACY | Facility: HOSPITAL | Age: 74
End: 2024-11-13
Payer: MEDICARE

## 2024-11-13 DIAGNOSIS — I26.99 PULMONARY EMBOLISM, UNSPECIFIED CHRONICITY, UNSPECIFIED PULMONARY EMBOLISM TYPE, UNSPECIFIED WHETHER ACUTE COR PULMONALE PRESENT (MULTI): ICD-10-CM

## 2024-11-13 DIAGNOSIS — I82.4Y9 DEEP VEIN THROMBOSIS (DVT) OF PROXIMAL LOWER EXTREMITY, UNSPECIFIED CHRONICITY, UNSPECIFIED LATERALITY (MULTI): ICD-10-CM

## 2024-11-13 DIAGNOSIS — D68.61 ANTIPHOSPHOLIPID ANTIBODY SYNDROME (MULTI): Primary | ICD-10-CM

## 2024-11-13 LAB
POC INR: 2.7
POC PROTHROMBIN TIME: NORMAL

## 2024-11-13 PROCEDURE — 85610 PROTHROMBIN TIME: CPT | Mod: QW

## 2024-11-13 PROCEDURE — 99211 OFF/OP EST MAY X REQ PHY/QHP: CPT

## 2024-11-13 NOTE — PROGRESS NOTES
Pt enrolled in [facility] for management of Antiphospholipid antibody syndrome (Multi) [D68.61].     Pt current location in clinic.     Current anticoagulant: Warfarin    Time since last visit: 4 weeks    Last INR: 2.7 on warfarin 37.5 mg in the previous week. No changes were made at that time.    Last Creatinine:   Lab Results   Component Value Date    CREATININE 1.20 (H) 07/03/2024     Last hemoglobin/hematocrit:  Lab Results   Component Value Date    HGB 13.7 07/03/2024     Lab Results   Component Value Date    HCT 43.7 07/03/2024       Current INR: 2.7 is therapeutic for goal range of 2.0-3.0 and is reflective of 32.5 mg in the previous week prior to visit.    Dosing confirmed with patient  Patient does state she took warfarin Wednesday than ended up throwing up immediately afterwards. Took 7.5 mg day after to make up missed dose.  Yesterday she started amoxicillin for 7 days.  Patient denies any adverse reactions or barriers.  Patient denies any CP/SOB, fatigue, bleeding or bruising since last visit.   Patient denies any change in alcohol or tobacco use since last visit.   Patient denies any upcoming medical or dental procedures. Planning on having tooth pulled and said that she would follow up to see if they want to hold warfarin.    Plan:  Patient was instructed to continue with current warfarin dose.  INR follow up will occur in 3 weeks.  Patient was instructed to maintain consistent vegetable intake, to monitor for any bruising or bleeding, and to call with any medication changes or concerns.    Pt handout given with above information    Segun Thomas PharmD  P:[phone]  F:[phone]

## 2024-11-18 DIAGNOSIS — F32.A ANXIETY AND DEPRESSION: ICD-10-CM

## 2024-11-18 DIAGNOSIS — F41.9 ANXIETY AND DEPRESSION: ICD-10-CM

## 2024-11-18 DIAGNOSIS — E03.9 ACQUIRED HYPOTHYROIDISM: ICD-10-CM

## 2024-11-18 DIAGNOSIS — I73.9 PAD (PERIPHERAL ARTERY DISEASE) (CMS-HCC): ICD-10-CM

## 2024-11-18 RX ORDER — CARVEDILOL 6.25 MG/1
3.12 TABLET ORAL
Qty: 90 TABLET | Refills: 3 | Status: SHIPPED | OUTPATIENT
Start: 2024-11-18

## 2024-11-18 RX ORDER — LEVOTHYROXINE SODIUM 50 UG/1
50 TABLET ORAL DAILY
Qty: 90 TABLET | Refills: 3 | Status: SHIPPED | OUTPATIENT
Start: 2024-11-18

## 2024-11-18 RX ORDER — FLUOXETINE HYDROCHLORIDE 20 MG/1
20 CAPSULE ORAL DAILY
Qty: 90 CAPSULE | Refills: 3 | Status: SHIPPED | OUTPATIENT
Start: 2024-11-18

## 2024-11-19 ENCOUNTER — APPOINTMENT (OUTPATIENT)
Dept: ORTHOPEDIC SURGERY | Facility: CLINIC | Age: 74
End: 2024-11-19
Payer: MEDICARE

## 2024-11-19 ENCOUNTER — HOSPITAL ENCOUNTER (OUTPATIENT)
Dept: RADIOLOGY | Facility: EXTERNAL LOCATION | Age: 74
Discharge: HOME | End: 2024-11-19

## 2024-11-19 ENCOUNTER — HOSPITAL ENCOUNTER (OUTPATIENT)
Dept: RADIOLOGY | Facility: CLINIC | Age: 74
Discharge: HOME | End: 2024-11-19
Payer: MEDICARE

## 2024-11-19 DIAGNOSIS — M17.11 ARTHRITIS OF RIGHT KNEE: ICD-10-CM

## 2024-11-19 DIAGNOSIS — M17.31 POST-TRAUMATIC OSTEOARTHRITIS OF RIGHT KNEE: Primary | ICD-10-CM

## 2024-11-19 DIAGNOSIS — M17.31 POST-TRAUMATIC OSTEOARTHRITIS OF RIGHT KNEE: ICD-10-CM

## 2024-11-19 PROCEDURE — 20611 DRAIN/INJ JOINT/BURSA W/US: CPT | Performed by: NURSE PRACTITIONER

## 2024-11-19 PROCEDURE — 73564 X-RAY EXAM KNEE 4 OR MORE: CPT | Mod: RT

## 2024-11-19 PROCEDURE — 99214 OFFICE O/P EST MOD 30 MIN: CPT | Performed by: NURSE PRACTITIONER

## 2024-11-19 RX ORDER — AMOXICILLIN 500 MG/1
500 CAPSULE ORAL 4 TIMES DAILY
COMMUNITY
Start: 2024-11-12

## 2024-11-19 ASSESSMENT — PAIN SCALES - GENERAL: PAINLEVEL_OUTOF10: 5 - MODERATE PAIN

## 2024-11-19 ASSESSMENT — ENCOUNTER SYMPTOMS
CONSTITUTIONAL NEGATIVE: 1
ENDOCRINE NEGATIVE: 1
CARDIOVASCULAR NEGATIVE: 1
PSYCHIATRIC NEGATIVE: 1
RESPIRATORY NEGATIVE: 1
NEUROLOGICAL NEGATIVE: 1
HEMATOLOGIC/LYMPHATIC NEGATIVE: 1
ARTHRALGIAS: 1

## 2024-11-19 ASSESSMENT — PAIN DESCRIPTION - DESCRIPTORS: DESCRIPTORS: ACHING;SHARP

## 2024-11-19 ASSESSMENT — PAIN - FUNCTIONAL ASSESSMENT: PAIN_FUNCTIONAL_ASSESSMENT: 0-10

## 2024-11-19 NOTE — PROGRESS NOTES
Subjective    Patient ID: Marine Luna is a 74 y.o. female.    Chief Complaint:   Chief Complaint   Patient presents with    Right Knee - Follow-up, Pain     HPI:    Marine is a pleasant 74-year-old female presenting today for new problem evaluation of right knee OA.  Patient has been experiencing increased pain and swelling to the medial aspect of the knee into the proximal lower leg for approximately 1 week.  No identified injury, symptoms aggravated with ambulatory activities.  Patient has been using compression brace and TENS unit which is helping some with symptoms.  Lidocaine is not helping with the new symptoms.  Last viscosupplementation from 9/23/2024 still in effect for OA sx.     Review of Systems   Constitutional: Negative.    HENT: Negative.     Respiratory: Negative.     Cardiovascular: Negative.    Endocrine: Negative.    Musculoskeletal:  Positive for arthralgias.   Skin: Negative.    Neurological: Negative.    Hematological: Negative.    Psychiatric/Behavioral: Negative.         Objective   Right Knee Exam     Tenderness   The patient is experiencing Minimal tenderness in the medial joint line.    Range of Motion   Extension:  0   Flexion:  120     Tests   Malika:  Medial - negative Lateral - negative  Varus: negative Valgus: negative  Lachman:  Anterior - negative      Drawer:  Anterior - negative    Posterior - negative    Other   Sensation: normal  Pulse: present  Swelling: mild    Comments:  Mild edema to medial aspect of the knee .   Full ROM of distal joints with no sx aggravation distal motor and sensory intact, cap refill at 2 seconds.  Mild aggravation of medial knee discomfort with testing, no laxity noted.  Decrease in hematoma of mid anterior shin, improvement in color with slight bruising remaining.      Image Results:    4/20/2023 L knee  FINDINGS:  November 16, 2020 Advanced osteoarthritis right knee mostly medial  compartment. No fracture seen. No osseous lesions.      IMPRESSION:  Advanced osteoarthritis right knee particularly in the medial  compartment.    Patient ID: Marine Luna is a 74 y.o. female.    L Inj/Asp: R knee on 11/20/2024 12:16 PM  Indications: pain and joint swelling  Details: 22 G needle, ultrasound-guided superolateral approach  Medications: 40 mg triamcinolone acetonide 40 mg/mL  Outcome: tolerated well, no immediate complications    We discussed risk and benefits of cortisone injection, patient wishes to proceed via verbal consent.  Skin was prepped with Betadine, Vapocoolant spray and alcohol.  Direct visualization using high-frequency linear probe of ultrasound was utilized to administer the injection of 40 mg Kenalog, 3 cc 2% lidocaine and 3 cc of 0.25% bupivacaine.  Patient tolerated injection well lidocaine suppression.  No bleeding postinjection, bandage to site.  Images were saved under MRN number into the PACS system.    Procedure, treatment alternatives, risks and benefits explained, specific risks discussed. Consent was given by the patient. Immediately prior to procedure a time out was called to verify the correct patient, procedure, equipment, support staff and site/side marked as required. Patient was prepped and draped in the usual sterile fashion.           Assessment/Plan   Encounter Diagnoses:  Problem List Items Addressed This Visit             ICD-10-CM    Arthritis of right knee M17.11     We reviewed conservative measures for knee OA symptom control (acute on chronic).  Patient to continue to take Tylenol per package directions, diclofenac gel up to 4 times daily.  Patient to perform light activity today, gradually resume normal activities tomorrow and increase as tolerated.  Encouraged use of compression brace with medial and lateral stability, frequent elevation and rest.  Ice as needed for swelling.  Patient may also use heat prior to doing some stretching and exercising, AAOS knee conditioning exercises provided to begin in 1  week and advance as tolerated.  Plan for follow-up here in approximately 1 month, sooner for changes or concerns.    Patient is in agreement with plan of care.  This note was generated using Dragon software. It may contain errors in wording, punctuation or spelling.          Relevant Orders    Point of Care Ultrasound (Completed)     Other Visit Diagnoses         Codes    Post-traumatic osteoarthritis of right knee    -  Primary M17.31

## 2024-11-20 RX ORDER — TRIAMCINOLONE ACETONIDE 40 MG/ML
40 INJECTION, SUSPENSION INTRA-ARTICULAR; INTRAMUSCULAR
Status: COMPLETED | OUTPATIENT
Start: 2024-11-20 | End: 2024-11-20

## 2024-11-20 NOTE — ASSESSMENT & PLAN NOTE
We reviewed conservative measures for knee OA symptom control (acute on chronic).  Patient to continue to take Tylenol per package directions, diclofenac gel up to 4 times daily.  Patient to perform light activity today, gradually resume normal activities tomorrow and increase as tolerated.  Encouraged use of compression brace with medial and lateral stability, frequent elevation and rest.  Ice as needed for swelling.  Patient may also use heat prior to doing some stretching and exercising, AAOS knee conditioning exercises provided to begin in 1 week and advance as tolerated.  Plan for follow-up here in approximately 1 month, sooner for changes or concerns.    Patient is in agreement with plan of care.  This note was generated using Dragon software. It may contain errors in wording, punctuation or spelling.

## 2024-11-26 NOTE — PROGRESS NOTES
Patient called letting me know that tooth extraction is planned for December 9th. Dr. Holden (Dentist) is supposed to call back about if he wants to hold warfarin and if he does how long. Dr. Holden's office called back to us to let us know he wants to hold her warfarin 2 days before procedure and I asked nurse (Asuncion) if they wanted an INR on the Friday before and she stated they did not need one since her INR was not elevated at last visit. Called patient and told her to hold the 2 days before the procedure then to take a tablet and a 1/2 after procedure is over unless dentist wants her to wait.

## 2024-12-11 ENCOUNTER — ANTICOAGULATION - WARFARIN VISIT (OUTPATIENT)
Dept: PHARMACY | Facility: HOSPITAL | Age: 74
End: 2024-12-11
Payer: MEDICARE

## 2024-12-11 DIAGNOSIS — Z86.718 PERSONAL HISTORY OF VENOUS THROMBOSIS AND EMBOLUS: ICD-10-CM

## 2024-12-11 DIAGNOSIS — D68.61 ANTIPHOSPHOLIPID ANTIBODY SYNDROME (MULTI): ICD-10-CM

## 2024-12-11 DIAGNOSIS — Z86.711 PERSONAL HISTORY OF PE (PULMONARY EMBOLISM): Primary | ICD-10-CM

## 2024-12-11 LAB
POC INR: 1.3
POC PROTHROMBIN TIME: NORMAL

## 2024-12-11 PROCEDURE — 85610 PROTHROMBIN TIME: CPT | Mod: QW

## 2024-12-11 PROCEDURE — 99211 OFF/OP EST MAY X REQ PHY/QHP: CPT | Performed by: PHARMACIST

## 2024-12-17 ENCOUNTER — ANTICOAGULATION - WARFARIN VISIT (OUTPATIENT)
Dept: PHARMACY | Facility: HOSPITAL | Age: 74
End: 2024-12-17
Payer: MEDICARE

## 2024-12-17 ENCOUNTER — APPOINTMENT (OUTPATIENT)
Dept: ORTHOPEDIC SURGERY | Facility: CLINIC | Age: 74
End: 2024-12-17
Payer: MEDICARE

## 2024-12-17 ENCOUNTER — LAB (OUTPATIENT)
Dept: LAB | Facility: LAB | Age: 74
End: 2024-12-17
Payer: MEDICARE

## 2024-12-17 DIAGNOSIS — D68.61 ANTIPHOSPHOLIPID ANTIBODY SYNDROME (MULTI): Primary | ICD-10-CM

## 2024-12-17 DIAGNOSIS — M17.11 ARTHRITIS OF RIGHT KNEE: ICD-10-CM

## 2024-12-17 DIAGNOSIS — I82.4Y9 DEEP VEIN THROMBOSIS (DVT) OF PROXIMAL LOWER EXTREMITY, UNSPECIFIED CHRONICITY, UNSPECIFIED LATERALITY (MULTI): ICD-10-CM

## 2024-12-17 DIAGNOSIS — I26.99 PULMONARY EMBOLISM, UNSPECIFIED CHRONICITY, UNSPECIFIED PULMONARY EMBOLISM TYPE, UNSPECIFIED WHETHER ACUTE COR PULMONALE PRESENT (MULTI): ICD-10-CM

## 2024-12-17 DIAGNOSIS — I82.90 DEEP VEIN THROMBOSIS (DVT) OF NON-EXTREMITY VEIN, UNSPECIFIED CHRONICITY: ICD-10-CM

## 2024-12-17 DIAGNOSIS — M17.31 POST-TRAUMATIC OSTEOARTHRITIS OF RIGHT KNEE: ICD-10-CM

## 2024-12-17 LAB
ALBUMIN SERPL BCP-MCNC: 4.2 G/DL (ref 3.4–5)
ALP SERPL-CCNC: 50 U/L (ref 33–136)
ALT SERPL W P-5'-P-CCNC: 17 U/L (ref 7–45)
ANION GAP SERPL CALC-SCNC: 11 MMOL/L (ref 10–20)
AST SERPL W P-5'-P-CCNC: 15 U/L (ref 9–39)
BASOPHILS # BLD AUTO: 0.06 X10*3/UL (ref 0–0.1)
BASOPHILS NFR BLD AUTO: 0.8 %
BILIRUB SERPL-MCNC: 0.3 MG/DL (ref 0–1.2)
BUN SERPL-MCNC: 22 MG/DL (ref 6–23)
CALCIUM SERPL-MCNC: 9 MG/DL (ref 8.6–10.3)
CHLORIDE SERPL-SCNC: 104 MMOL/L (ref 98–107)
CO2 SERPL-SCNC: 28 MMOL/L (ref 21–32)
CREAT SERPL-MCNC: 1.01 MG/DL (ref 0.5–1.05)
EGFRCR SERPLBLD CKD-EPI 2021: 59 ML/MIN/1.73M*2
EOSINOPHIL # BLD AUTO: 0.32 X10*3/UL (ref 0–0.4)
EOSINOPHIL NFR BLD AUTO: 4.5 %
ERYTHROCYTE [DISTWIDTH] IN BLOOD BY AUTOMATED COUNT: 14.3 % (ref 11.5–14.5)
GLUCOSE SERPL-MCNC: 83 MG/DL (ref 74–99)
HCT VFR BLD AUTO: 42.5 % (ref 36–46)
HGB BLD-MCNC: 13.5 G/DL (ref 12–16)
IMM GRANULOCYTES # BLD AUTO: 0.04 X10*3/UL (ref 0–0.5)
IMM GRANULOCYTES NFR BLD AUTO: 0.6 % (ref 0–0.9)
LYMPHOCYTES # BLD AUTO: 1.86 X10*3/UL (ref 0.8–3)
LYMPHOCYTES NFR BLD AUTO: 26.1 %
MCH RBC QN AUTO: 30.8 PG (ref 26–34)
MCHC RBC AUTO-ENTMCNC: 31.8 G/DL (ref 32–36)
MCV RBC AUTO: 97 FL (ref 80–100)
MONOCYTES # BLD AUTO: 0.53 X10*3/UL (ref 0.05–0.8)
MONOCYTES NFR BLD AUTO: 7.4 %
NEUTROPHILS # BLD AUTO: 4.32 X10*3/UL (ref 1.6–5.5)
NEUTROPHILS NFR BLD AUTO: 60.6 %
NRBC BLD-RTO: 0 /100 WBCS (ref 0–0)
PLATELET # BLD AUTO: 336 X10*3/UL (ref 150–450)
POC INR: 2.8
POC PROTHROMBIN TIME: NORMAL
POTASSIUM SERPL-SCNC: 4.2 MMOL/L (ref 3.5–5.3)
PROT SERPL-MCNC: 6.4 G/DL (ref 6.4–8.2)
RBC # BLD AUTO: 4.39 X10*6/UL (ref 4–5.2)
SODIUM SERPL-SCNC: 139 MMOL/L (ref 136–145)
WBC # BLD AUTO: 7.1 X10*3/UL (ref 4.4–11.3)

## 2024-12-17 PROCEDURE — 36415 COLL VENOUS BLD VENIPUNCTURE: CPT

## 2024-12-17 PROCEDURE — 80053 COMPREHEN METABOLIC PANEL: CPT

## 2024-12-17 PROCEDURE — 99211 OFF/OP EST MAY X REQ PHY/QHP: CPT

## 2024-12-17 PROCEDURE — 85025 COMPLETE CBC W/AUTO DIFF WBC: CPT

## 2024-12-17 PROCEDURE — 85610 PROTHROMBIN TIME: CPT | Mod: QW

## 2024-12-17 PROCEDURE — 1159F MED LIST DOCD IN RCRD: CPT | Performed by: NURSE PRACTITIONER

## 2024-12-17 PROCEDURE — 1036F TOBACCO NON-USER: CPT | Performed by: NURSE PRACTITIONER

## 2024-12-17 PROCEDURE — 99212 OFFICE O/P EST SF 10 MIN: CPT | Performed by: NURSE PRACTITIONER

## 2024-12-17 ASSESSMENT — ENCOUNTER SYMPTOMS
HEMATOLOGIC/LYMPHATIC NEGATIVE: 1
CARDIOVASCULAR NEGATIVE: 1
ENDOCRINE NEGATIVE: 1
ARTHRALGIAS: 1
RESPIRATORY NEGATIVE: 1
PSYCHIATRIC NEGATIVE: 1
CONSTITUTIONAL NEGATIVE: 1
NEUROLOGICAL NEGATIVE: 1

## 2024-12-17 ASSESSMENT — PAIN - FUNCTIONAL ASSESSMENT: PAIN_FUNCTIONAL_ASSESSMENT: NO/DENIES PAIN

## 2024-12-17 NOTE — PROGRESS NOTES
"  Subjective    Patient ID: Marine Luna is a 74 y.o. female.    Chief Complaint:   Chief Complaint   Patient presents with    Right Knee - Follow-up, Pain     States pain is \"pretty good\".     HPI:    Marine is a pleasant 74-year-old female presenting today for FUV of right knee OA, cortisone inj 11/19/2024.  Patient states symptoms are well-controlled at this time, mild aching with weather changes or certain activities.  Patient has been using compression brace and topical gel with good symptom control.    Last viscosupplementation from 9/23/2024 still in effect for OA sx. which has been providing good symptom relief and allowing patient to do what she wants and needs to do it.  New injuries    Review of Systems   Constitutional: Negative.    HENT: Negative.     Respiratory: Negative.     Cardiovascular: Negative.    Endocrine: Negative.    Musculoskeletal:  Positive for arthralgias.   Skin: Negative.    Neurological: Negative.    Hematological: Negative.    Psychiatric/Behavioral: Negative.         Objective   Right Knee Exam     Tenderness   The patient is experiencing Minimal tenderness in the medial joint line.    Range of Motion   Extension:  0   Flexion:  120     Tests   Malika:  Medial - negative Lateral - negative  Varus: negative Valgus: negative  Lachman:  Anterior - negative      Drawer:  Anterior - negative    Posterior - negative    Other   Sensation: normal  Pulse: present  Swelling: mild    Comments: Minimal edema to medial aspect of the knee .   Full ROM of distal joints with no sx aggravation distal motor and sensory intact, cap refill at 2 seconds, mild crepitus on exam. .  Solving hematoma of mid anterior shin, improvement in color with slight bruising/hemosiderin staining remaining.      Image Results:    4/20/2023 L knee  FINDINGS:  November 16, 2020 Advanced osteoarthritis right knee mostly medial  compartment. No fracture seen. No osseous lesions.     IMPRESSION:  Advanced " osteoarthritis right knee particularly in the medial  compartment.        Assessment/Plan   Encounter Diagnoses:  Problem List Items Addressed This Visit             ICD-10-CM    Arthritis of right knee M17.11     We reviewed conservative measures for knee OA symptom control.  Patient to continue to take Tylenol per package directions, diclofenac gel up to 4 times daily.  Continue with home exercises and advance as tolerated, compression sleeve for as needed use for edema control and symptom relief.  We did discuss possibility of surgical evaluation at some point if symptoms are not well-controlled with injectables and home exercises.    Plan will be to follow-up in approximately 3 months, sooner for changes or concerns.  Patient is in agreement with plan of care.  This note was generated using Dragon software. It may contain errors in wording, punctuation or spelling.              Post-traumatic osteoarthritis of right knee M17.31

## 2024-12-17 NOTE — PROGRESS NOTES
Pt enrolled in [facility] for management of Antiphospholipid antibody syndrome (Multi) [D68.61].     Pt current location in clinic.     Current anticoagulant: Warfarin    Time since last visit: 6 days    Last INR: 1.30 on warfarin 37.5 mg in the previous week. Pt was instructed to 10mg x1, 7.5mg the next day, then resume usual dosing at last visit.    Last Creatinine:   Lab Results   Component Value Date    CREATININE 1.20 (H) 07/03/2024     Last hemoglobin/hematocrit:  Lab Results   Component Value Date    HGB 13.7 07/03/2024     Lab Results   Component Value Date    HCT 43.7 07/03/2024       Current INR: 2.80 is therapeutic for goal range of 2.0-3.0 and is reflective of 37.5 mg in the previous week prior to visit.    Dosing confirmed with patient  Patient still having some pain control issues related to the tooth extraction. She noticed a pulled stitch yesterday, but no bleeding has been noticed with rinsing or brushing. She has been alternating between Tylenol and Advil, but will be seeing him next week to discuss further options related to pain control.   Patient denies any missed doses.  Patient denies any medication changes, diet changes, or OTC/herbal supplement changes since last visit.  Patient denies any adverse reactions or barriers.  Patient denies any CP/SOB, fatigue, bleeding or bruising since last visit.   Patient denies any change in alcohol or tobacco use since last visit.   Patient denies any upcoming medical or dental procedures.    Plan:  Patient was instructed to continue with current warfarin dose.  INR follow up will occur in 4 weeks.  Patient was instructed to maintain consistent vegetable intake, to monitor for any bruising or bleeding, and to call with any medication changes or concerns.    Pt handout given with above information    Papo Holden, MicheleD BCPS  P:[phone]  F:[phone]

## 2024-12-17 NOTE — ASSESSMENT & PLAN NOTE
We reviewed conservative measures for knee OA symptom control.  Patient to continue to take Tylenol per package directions, diclofenac gel up to 4 times daily.  Continue with home exercises and advance as tolerated, compression sleeve for as needed use for edema control and symptom relief.  We did discuss possibility of surgical evaluation at some point if symptoms are not well-controlled with injectables and home exercises.    Plan will be to follow-up in approximately 3 months, sooner for changes or concerns.  Patient is in agreement with plan of care.  This note was generated using Dragon software. It may contain errors in wording, punctuation or spelling.

## 2024-12-19 ENCOUNTER — OFFICE VISIT (OUTPATIENT)
Dept: HEMATOLOGY/ONCOLOGY | Facility: CLINIC | Age: 74
End: 2024-12-19
Payer: MEDICARE

## 2024-12-19 ENCOUNTER — LAB (OUTPATIENT)
Dept: LAB | Facility: LAB | Age: 74
End: 2024-12-19
Payer: MEDICARE

## 2024-12-19 ENCOUNTER — APPOINTMENT (OUTPATIENT)
Dept: PRIMARY CARE | Facility: CLINIC | Age: 74
End: 2024-12-19
Payer: MEDICARE

## 2024-12-19 VITALS
TEMPERATURE: 96.4 F | WEIGHT: 188.4 LBS | SYSTOLIC BLOOD PRESSURE: 129 MMHG | RESPIRATION RATE: 20 BRPM | DIASTOLIC BLOOD PRESSURE: 76 MMHG | HEART RATE: 63 BPM | BODY MASS INDEX: 32.34 KG/M2

## 2024-12-19 VITALS
SYSTOLIC BLOOD PRESSURE: 137 MMHG | BODY MASS INDEX: 31.76 KG/M2 | WEIGHT: 186 LBS | HEART RATE: 62 BPM | DIASTOLIC BLOOD PRESSURE: 75 MMHG | HEIGHT: 64 IN

## 2024-12-19 DIAGNOSIS — R53.82 CHRONIC FATIGUE: ICD-10-CM

## 2024-12-19 DIAGNOSIS — I74.3 EMBOLISM AND THROMBOSIS OF ARTERIES OF THE LOWER EXTREMITIES (MULTI): ICD-10-CM

## 2024-12-19 DIAGNOSIS — M17.31 POST-TRAUMATIC OSTEOARTHRITIS OF RIGHT KNEE: ICD-10-CM

## 2024-12-19 DIAGNOSIS — E03.9 ACQUIRED HYPOTHYROIDISM: ICD-10-CM

## 2024-12-19 DIAGNOSIS — I82.4Y9 DEEP VEIN THROMBOSIS (DVT) OF PROXIMAL LOWER EXTREMITY, UNSPECIFIED CHRONICITY, UNSPECIFIED LATERALITY (MULTI): ICD-10-CM

## 2024-12-19 DIAGNOSIS — Z13.220 SCREENING FOR LIPID DISORDERS: ICD-10-CM

## 2024-12-19 DIAGNOSIS — M76.31 ILIOTIBIAL BAND SYNDROME OF RIGHT SIDE: ICD-10-CM

## 2024-12-19 DIAGNOSIS — N18.31 CHRONIC KIDNEY DISEASE, STAGE 3A (MULTI): ICD-10-CM

## 2024-12-19 DIAGNOSIS — F32.A ANXIETY AND DEPRESSION: ICD-10-CM

## 2024-12-19 DIAGNOSIS — F41.9 ANXIETY AND DEPRESSION: ICD-10-CM

## 2024-12-19 DIAGNOSIS — I73.9 PAD (PERIPHERAL ARTERY DISEASE) (CMS-HCC): ICD-10-CM

## 2024-12-19 DIAGNOSIS — Z12.31 SCREENING MAMMOGRAM FOR BREAST CANCER: Primary | ICD-10-CM

## 2024-12-19 DIAGNOSIS — I82.90 DEEP VEIN THROMBOSIS (DVT) OF NON-EXTREMITY VEIN, UNSPECIFIED CHRONICITY: ICD-10-CM

## 2024-12-19 LAB
CHOLEST SERPL-MCNC: 220 MG/DL (ref 0–199)
CHOLESTEROL/HDL RATIO: 2.6
HDLC SERPL-MCNC: 84 MG/DL
LDLC SERPL CALC-MCNC: 98 MG/DL
NON HDL CHOLESTEROL: 136 MG/DL (ref 0–149)
TRIGL SERPL-MCNC: 188 MG/DL (ref 0–149)
VLDL: 38 MG/DL (ref 0–40)

## 2024-12-19 PROCEDURE — 1126F AMNT PAIN NOTED NONE PRSNT: CPT

## 2024-12-19 PROCEDURE — 1159F MED LIST DOCD IN RCRD: CPT

## 2024-12-19 PROCEDURE — 3008F BODY MASS INDEX DOCD: CPT | Performed by: INTERNAL MEDICINE

## 2024-12-19 PROCEDURE — 99214 OFFICE O/P EST MOD 30 MIN: CPT | Performed by: INTERNAL MEDICINE

## 2024-12-19 PROCEDURE — 36415 COLL VENOUS BLD VENIPUNCTURE: CPT

## 2024-12-19 PROCEDURE — 1036F TOBACCO NON-USER: CPT | Performed by: INTERNAL MEDICINE

## 2024-12-19 PROCEDURE — 1159F MED LIST DOCD IN RCRD: CPT | Performed by: INTERNAL MEDICINE

## 2024-12-19 PROCEDURE — 80061 LIPID PANEL: CPT

## 2024-12-19 PROCEDURE — 99213 OFFICE O/P EST LOW 20 MIN: CPT

## 2024-12-19 PROCEDURE — 1160F RVW MEDS BY RX/DR IN RCRD: CPT | Performed by: INTERNAL MEDICINE

## 2024-12-19 ASSESSMENT — ENCOUNTER SYMPTOMS
ABDOMINAL DISTENTION: 0
CHILLS: 0
VOMITING: 0
SINUS PRESSURE: 0
NAUSEA: 0
DIARRHEA: 0
NUMBNESS: 0
SINUS PAIN: 0
SORE THROAT: 0
ACTIVITY CHANGE: 0
FATIGUE: 0
APPETITE CHANGE: 0
BACK PAIN: 0
WHEEZING: 0
SHORTNESS OF BREATH: 0
WEAKNESS: 0
COUGH: 0

## 2024-12-19 ASSESSMENT — PAIN SCALES - GENERAL: PAINLEVEL_OUTOF10: 0-NO PAIN

## 2024-12-19 NOTE — PATIENT INSTRUCTIONS
Discussed and reviewed medical history   Reviewed labs- Hemoglobin is stable   Encouraged to continue on Coumadin as instructed by Anticoagulation Clinic  Labs prior to follow-up   Return to see APRN in 6 months

## 2024-12-19 NOTE — PROGRESS NOTES
"Subjective   Patient ID: Marine Luna is a 74 y.o. female who presents for Follow-up (6 month/Pt states she is okay on medication RF's).  HPI  Patient is here today for 6 mo follow up     Patient reports that she has had to have a tooth extraction and an implant done.     Otherwise Is doing ok..     Review of Systems   Constitutional:  Negative for activity change, appetite change, chills and fatigue.   HENT:  Negative for congestion, postnasal drip, sinus pressure, sinus pain and sore throat.    Respiratory:  Negative for cough, shortness of breath and wheezing.    Cardiovascular:  Negative for chest pain and leg swelling.   Gastrointestinal:  Negative for abdominal distention, diarrhea, nausea and vomiting.   Musculoskeletal:  Negative for back pain.   Neurological:  Negative for weakness and numbness.       Objective   /75   Pulse 62   Ht 1.626 m (5' 4\")   Wt 84.4 kg (186 lb)   LMP  (LMP Unknown)   BMI 31.93 kg/m²     Physical Exam  Constitutional:       General: She is not in acute distress.     Appearance: Normal appearance.   HENT:      Head: Normocephalic.      Nose: Nose normal.      Mouth/Throat:      Pharynx: No oropharyngeal exudate.   Eyes:      General:         Right eye: No discharge.         Left eye: No discharge.      Extraocular Movements: Extraocular movements intact.      Pupils: Pupils are equal, round, and reactive to light.   Cardiovascular:      Rate and Rhythm: Normal rate and regular rhythm.      Heart sounds: No murmur heard.     No gallop.   Pulmonary:      Effort: Pulmonary effort is normal. No respiratory distress.      Breath sounds: Normal breath sounds. No wheezing.   Musculoskeletal:         General: No swelling. Normal range of motion.   Skin:     General: Skin is warm and dry.      Coloration: Skin is not jaundiced.   Neurological:      General: No focal deficit present.      Mental Status: She is alert and oriented to person, place, and time.      Cranial Nerves: " No cranial nerve deficit.   Psychiatric:         Mood and Affect: Mood normal.         Behavior: Behavior normal.           Assessment/Plan   Problem List Items Addressed This Visit       DVT (deep venous thrombosis) (Multi)    PAD (peripheral artery disease) (CMS-[facility])    Acquired hypothyroidism    Chronic fatigue    Anxiety and depression    Iliotibial band syndrome of right side    Embolism and thrombosis of arteries of the lower extremities (Multi)    Chronic kidney disease, stage 3a (Multi)    Post-traumatic osteoarthritis of right knee     Other Visit Diagnoses       Screening mammogram for breast cancer    -  Primary    Relevant Orders    BI mammo bilateral screening tomosynthesis    Screening for lipid disorders        Relevant Orders    Lipid Panel          Mammo 2020, 1/24, will order   DEXA 2020, 1/24  Colonoscopy 2013, positive cologuard, colonoscopy 4/23 one polyp, return in 5 years   Pap 2017?, not needed anymore     Flu shot 2022, 2023, scheduled   COVID received   PNA received   Shingles zostavax, received 1st shingrix and had outbreak of shingles   RSV recommended     Hypertensive heart disease, HTN, HLD, PAD   - hx of PAD with likely embolic in nature with possibly thrombus in the distal aorta and right illiac in 2006  - on cilostazal 50mg po daily      2. Unprovoked DVT with bilateral PE in 2/22   - following with Hematology and Vascular   - + for antiphospholipid   - on coumadin       3. Anxiety and depression, s table   - on wellbutrin 150mg po daily   - continue prozac 20mg po daily   - uses lorazepam sparingly     4. Hypothyroidism   - on synthroid   - tsh 7/24     5.  Former smoker   Smoked 30 years and smoked 3/4 ppd, quit in 2004      6. Fatigue   - declines to do sleep study      Advised pt that I will be leaving  in March of 2025.     Final diagnoses:   [Z12.31] Screening mammogram for breast cancer   [Z13.220] Screening for lipid disorders   [I73.9] PAD (peripheral artery disease)  (CMS-[facility])   [I82.4Y9] Deep vein thrombosis (DVT) of proximal lower extremity, unspecified chronicity, unspecified laterality (Multi)   [I74.3] Embolism and thrombosis of arteries of the lower extremities (Multi)   [E03.9] Acquired hypothyroidism   [N18.31] Chronic kidney disease, stage 3a (Multi)   [F41.9, F32.A] Anxiety and depression   [M76.31] Iliotibial band syndrome of right side   [M17.31] Post-traumatic osteoarthritis of right knee   [R53.82] Chronic fatigue

## 2024-12-19 NOTE — PROGRESS NOTES
Patient ID: Marine Luna is a 74 y.o. female.    Subjective    HPI    Patient ID: Marine Luna is a 74 y.o. female.        Subjective  HPI     Visit Type: Follow Up Visit      History of Present Illness:      ID Statement:    MARINE LUNA is a 73 year old Female        Chief Complaint: Evaluation for PE   Interval History:    Referred by Jose C Liang MD      Reason for referral: PE      HPI  71 year old woman with hx of GERD, HL, depression/anxiety, HTN who presented to ER on 2/7/22 with back pain and abdominal pain, a CT C/A/P  showed acute bilateral PE with b/l GGO possibly infarcts, she had also dopplers of b/l LE that showed b/l acute DVTs. She was started on heparin and discharged on Eliquis, she was prior to that on Plavix and cilostazol which was stopped.      she had pain in the LE b/l, then se started having difficulty breathing, she was finally convinced to go to the hospital where she had above investigations      in 2007 he had similar symptoms, with blue toes, but at that time she had embolic event to the toe     she feels she is improved now her back pain is better and her breathing is better   her feet and legs are hurting more , attributes that to not taking plavix or cilostazol   she is active and up on her feet but has WILSON     she has been eating well,   no weight loss   tolerating eliquis well without bleeding   no fever or infections   no night sweats      she had MMG last year that was negative and will have her yearly one this   no recent pap smear   had colonoscopy in 2013      she was found to have positive APLA and was switched to coumadin      interval history - 12/19/24     Recently under went a dental procedure that required her to be off warfarin for a brief period  INR has returned to therapeutic levels  She is on warfarin- 5mg everyday, except on Wednesday takes 7mg   bruises easily     Energy is doing well, remains to stay active by doing [employer], knitting  Appetite  is good, eating and drinking well   WILSON with stairs, otherwise no breathing issues   No chest pain or pressure      No hematochezia or melena  No urinary issues, no hematuria      Edema at times in ankles, knee      Knee and ankle pain, unchanged   restless leg remains unchanged, takes sinemet for control   No swelling or LLE pain, continues to wear compression stockings            Objective    BSA: 1.96 meters squared  /76   Pulse 63   Temp 35.8 °C (96.4 °F)   Resp 20   Wt 85.5 kg (188 lb 6.4 oz)   LMP  (LMP Unknown)   BMI 32.34 kg/m²      Physical Exam  Vitals and nursing note reviewed.   Constitutional:       Appearance: Normal appearance.   HENT:      Head: Normocephalic and atraumatic.      Nose: Nose normal.      Mouth/Throat:      Mouth: Mucous membranes are moist.      Pharynx: Oropharynx is clear.   Eyes:      General: No scleral icterus.     Extraocular Movements: Extraocular movements intact.      Conjunctiva/sclera: Conjunctivae normal.   Cardiovascular:      Rate and Rhythm: Normal rate and regular rhythm.      Pulses: Normal pulses.      Heart sounds: Normal heart sounds.   Pulmonary:      Effort: Pulmonary effort is normal.      Breath sounds: Normal breath sounds.   Abdominal:      Palpations: Abdomen is soft.   Musculoskeletal:         General: Swelling present. Normal range of motion.      Cervical back: Normal range of motion.   Skin:     General: Skin is warm and dry.      Coloration: Skin is pale.      Findings: Bruising present.   Neurological:      General: No focal deficit present.      Mental Status: She is alert and oriented to person, place, and time.   Psychiatric:         Mood and Affect: Mood normal.         Behavior: Behavior normal.         Thought Content: Thought content normal.         Judgment: Judgment normal.         Performance Status:  Asymptomatic      Assessment/Plan        1. Pulmonary embolism/ DVT - likely APLS     unprovoked DVT/ PE      recommend lifelong  anticoagulation with periodic assessment of bleeding risk , continue Eliquis 5 mg twice a day, after 6-12 months of anticoagulation the dose can reduced to 2.5 mg twice a day      plan to check D-dimer, APLA , PTG an FVL mutation, protein C&S , AT levels due to hx of both arterial and venous events      5/31/22- the evaluation showed strongly positive triple positive APLA   her repeat testing confirms diagnosis      6/15/23- Remains on Warfarin and follows with Coumadin Clinic. PT/INRs have mostly been with in therapeutic range up until recently which was PT- 21.5, INR - 1.8. Patient was dose adjusted accordingly by coumadin clinic.  She denies any abnormal bleeding  or bruising problems. Her D-dimer is with in normal limits. She is tolerating warfarin with no issues. Will continue to monitor pt.     Hgb is stable at 13.2. Previously discussed checking additional lab for nutritional deficiency such as iron or b12. Since her labs have normalized, will hold on any additional labs at this time. Will continue to periodically see patient for bleeding risk  assessments.      Educated patient on calling or  visit closest ER with any persistent headaches, visual disturbances, weakness, numbness, severe bleeding/bruising or any concerning signs or symptoms.  - May need to wear a medical alert bracelet stating the use of anticoagulants.  - To call and schedule appt with one of our physicians for pre-operative clearance and recommendations at least 1 month before any invasive procedures/surgeries     12/19/24- Routine follow-up today. Continues taking warfarin. PT/INRs have essentially been stable. Denies any bleeding risk or clinical concerns. Patient remains to bruise easily. Over patient is doing well.      Continue on warfarin- managed by coumadin clinic. Will continue to follow patient for periodic bleeding risk assessments.     PLAN:  Discussed and reviewed medical history   Reviewed labs- Hemoglobin is stable    Encouraged to continue on Coumadin as instructed by Anticoagulation Clinic  Labs prior to follow-up   Return to see APRN in 6 months        Rona Lang, ABRAM-CNP

## 2024-12-19 NOTE — PROGRESS NOTES
Pt to return to clinic in 6 months.  Scheduled for 6/19/25 Pt to have labs at the hospital the week before.

## 2024-12-23 ENCOUNTER — APPOINTMENT (OUTPATIENT)
Dept: ORTHOPEDIC SURGERY | Facility: CLINIC | Age: 74
End: 2024-12-23
Payer: MEDICARE

## 2025-01-15 ENCOUNTER — APPOINTMENT (OUTPATIENT)
Dept: PHARMACY | Facility: HOSPITAL | Age: 75
End: 2025-01-15
Payer: MEDICARE

## 2025-01-28 ENCOUNTER — APPOINTMENT (OUTPATIENT)
Dept: RADIOLOGY | Facility: CLINIC | Age: 75
End: 2025-01-28
Payer: MEDICARE

## 2025-01-29 ENCOUNTER — HOSPITAL ENCOUNTER (OUTPATIENT)
Dept: RADIOLOGY | Facility: CLINIC | Age: 75
Discharge: HOME | End: 2025-01-29
Payer: MEDICARE

## 2025-01-29 ENCOUNTER — ANTICOAGULATION - WARFARIN VISIT (OUTPATIENT)
Dept: PHARMACY | Facility: HOSPITAL | Age: 75
End: 2025-01-29
Payer: MEDICARE

## 2025-01-29 VITALS — WEIGHT: 188.38 LBS | HEIGHT: 64 IN | BODY MASS INDEX: 32.16 KG/M2

## 2025-01-29 DIAGNOSIS — I82.4Y9 DEEP VEIN THROMBOSIS (DVT) OF PROXIMAL LOWER EXTREMITY, UNSPECIFIED CHRONICITY, UNSPECIFIED LATERALITY (MULTI): ICD-10-CM

## 2025-01-29 DIAGNOSIS — D68.61 ANTIPHOSPHOLIPID ANTIBODY SYNDROME (MULTI): Primary | ICD-10-CM

## 2025-01-29 DIAGNOSIS — Z12.31 SCREENING MAMMOGRAM FOR BREAST CANCER: ICD-10-CM

## 2025-01-29 DIAGNOSIS — I26.99 PULMONARY EMBOLISM, UNSPECIFIED CHRONICITY, UNSPECIFIED PULMONARY EMBOLISM TYPE, UNSPECIFIED WHETHER ACUTE COR PULMONALE PRESENT (MULTI): ICD-10-CM

## 2025-01-29 LAB
POC INR: 2.3
POC PROTHROMBIN TIME: NORMAL

## 2025-01-29 PROCEDURE — 77067 SCR MAMMO BI INCL CAD: CPT

## 2025-01-29 PROCEDURE — 99211 OFF/OP EST MAY X REQ PHY/QHP: CPT

## 2025-01-29 PROCEDURE — 77063 BREAST TOMOSYNTHESIS BI: CPT | Performed by: RADIOLOGY

## 2025-01-29 PROCEDURE — 85610 PROTHROMBIN TIME: CPT | Mod: QW

## 2025-01-29 PROCEDURE — 77067 SCR MAMMO BI INCL CAD: CPT | Performed by: RADIOLOGY

## 2025-01-29 NOTE — PROGRESS NOTES
Pt enrolled in [facility] for management of Antiphospholipid antibody syndrome (Multi) [D68.61].     Pt current location in clinic.     Current anticoagulant: Warfarin    Time since last visit: 5 weeks    Last INR: 2.8 on warfarin 42.5 mg in the previous week. No changes were made at that time.    Last Creatinine:   Lab Results   Component Value Date    CREATININE 1.01 12/17/2024     Last hemoglobin/hematocrit:  Lab Results   Component Value Date    HGB 13.5 12/17/2024     Lab Results   Component Value Date    HCT 42.5 12/17/2024       Current INR: 2.3 is therapeutic for goal range of 2.0-3.0 and is reflective of 42.5 mg in the previous week prior to visit.    Dosing confirmed with patient  Patient denies any missed doses.  Patient denies any medication changes, diet changes, or OTC/herbal supplement changes since last visit.  Patient denies any adverse reactions or barriers.  Patient denies any CP/SOB, fatigue, bleeding or bruising since last visit.   Patient denies any change in alcohol or tobacco use since last visit.   Patient denies any upcoming medical or dental procedures.    Plan:  Patient was instructed to continue with current warfarin dose.  INR follow up will occur in 5 weeks.  Patient was instructed to maintain consistent vegetable intake, to monitor for any bruising or bleeding, and to call with any medication changes or concerns.    Pt handout given with above information    Jose Del Rio, PharmD  P:[phone]  F:[phone]

## 2025-02-17 DIAGNOSIS — F41.9 ANXIETY AND DEPRESSION: ICD-10-CM

## 2025-02-17 DIAGNOSIS — F32.A ANXIETY AND DEPRESSION: ICD-10-CM

## 2025-02-17 RX ORDER — BUPROPION HYDROCHLORIDE 150 MG/1
150 TABLET, EXTENDED RELEASE ORAL DAILY
Qty: 90 TABLET | Refills: 3 | Status: SHIPPED | OUTPATIENT
Start: 2025-02-17

## 2025-02-27 DIAGNOSIS — I73.9 PAD (PERIPHERAL ARTERY DISEASE) (CMS-HCC): ICD-10-CM

## 2025-02-27 DIAGNOSIS — D68.61 ANTIPHOSPHOLIPID ANTIBODY SYNDROME (MULTI): ICD-10-CM

## 2025-02-27 DIAGNOSIS — I82.90 DEEP VEIN THROMBOSIS (DVT) OF NON-EXTREMITY VEIN, UNSPECIFIED CHRONICITY: Primary | ICD-10-CM

## 2025-02-27 DIAGNOSIS — I74.3 EMBOLISM AND THROMBOSIS OF ARTERIES OF THE LOWER EXTREMITIES (MULTI): ICD-10-CM

## 2025-02-27 DIAGNOSIS — E78.5 HYPERLIPIDEMIA, UNSPECIFIED HYPERLIPIDEMIA TYPE: ICD-10-CM

## 2025-02-27 RX ORDER — EZETIMIBE 10 MG/1
10 TABLET ORAL DAILY
Qty: 30 TABLET | Refills: 8 | Status: SHIPPED | OUTPATIENT
Start: 2025-02-27

## 2025-03-05 ENCOUNTER — ANTICOAGULATION - WARFARIN VISIT (OUTPATIENT)
Dept: PHARMACY | Facility: HOSPITAL | Age: 75
End: 2025-03-05
Payer: MEDICARE

## 2025-03-05 DIAGNOSIS — I74.3 EMBOLISM AND THROMBOSIS OF ARTERIES OF THE LOWER EXTREMITIES (MULTI): ICD-10-CM

## 2025-03-05 DIAGNOSIS — I82.90 DEEP VEIN THROMBOSIS (DVT) OF NON-EXTREMITY VEIN, UNSPECIFIED CHRONICITY: ICD-10-CM

## 2025-03-05 DIAGNOSIS — D68.61 ANTIPHOSPHOLIPID ANTIBODY SYNDROME (MULTI): Primary | ICD-10-CM

## 2025-03-05 DIAGNOSIS — I82.4Y9 DEEP VEIN THROMBOSIS (DVT) OF PROXIMAL LOWER EXTREMITY, UNSPECIFIED CHRONICITY, UNSPECIFIED LATERALITY (MULTI): ICD-10-CM

## 2025-03-05 DIAGNOSIS — I26.99 PULMONARY EMBOLISM, UNSPECIFIED CHRONICITY, UNSPECIFIED PULMONARY EMBOLISM TYPE, UNSPECIFIED WHETHER ACUTE COR PULMONALE PRESENT (MULTI): ICD-10-CM

## 2025-03-05 LAB
POC INR: 3.3
POC PROTHROMBIN TIME: NORMAL

## 2025-03-05 PROCEDURE — 85610 PROTHROMBIN TIME: CPT | Mod: QW

## 2025-03-05 PROCEDURE — 99211 OFF/OP EST MAY X REQ PHY/QHP: CPT

## 2025-03-05 NOTE — PROGRESS NOTES
Pt enrolled in [facility] for management of Antiphospholipid antibody syndrome (Multi) [D68.61].     Pt current location in clinic.     Current anticoagulant: Warfarin    Time since last visit: 5 weeks    Last INR: 2.3 on warfarin 37.5 mg in the previous week. No changes were made at that time.    Last Creatinine:   Lab Results   Component Value Date    CREATININE 1.01 12/17/2024     Last hemoglobin/hematocrit:  Lab Results   Component Value Date    HGB 13.5 12/17/2024     Lab Results   Component Value Date    HCT 42.5 12/17/2024       Current INR: 3.3 is therapeutic for goal range of 2.0-3.0 and is reflective of 37.5 mg in the previous week prior to visit.    Dosing confirmed with patient  Patient denies any missed doses.  Patient denies any medication changes, diet changes, or OTC/herbal supplement changes since last visit.  Pt has been taking 1 extra tylenol tablet today (3 bid instead of 2 bid)  Pt has been eating less salads than normal  Patient denies any adverse reactions or barriers.  Patient denies any CP/SOB, fatigue, bleeding or bruising since last visit.   Patient denies any change in alcohol or tobacco use since last visit.   Patient denies any upcoming medical or dental procedures.    Pt has tooth implant scheduled march 25th,     Plan:  Patient was instructed to take 5mg today, then resume usual dosing.  INR follow up will occur in 4 weeks.  Patient was instructed to maintain consistent vegetable intake, to monitor for any bruising or bleeding, and to call with any medication changes or concerns.    Pt handout given with above information    Jose Del Rio PharmD  P:[phone]  F:[phone]

## 2025-03-19 ENCOUNTER — HOSPITAL ENCOUNTER (OUTPATIENT)
Dept: RADIOLOGY | Facility: EXTERNAL LOCATION | Age: 75
Discharge: HOME | End: 2025-03-19

## 2025-03-19 ENCOUNTER — APPOINTMENT (OUTPATIENT)
Dept: ORTHOPEDIC SURGERY | Facility: CLINIC | Age: 75
End: 2025-03-19
Payer: MEDICARE

## 2025-03-19 ENCOUNTER — ANTICOAGULATION - WARFARIN VISIT (OUTPATIENT)
Dept: PHARMACY | Facility: HOSPITAL | Age: 75
End: 2025-03-19
Payer: MEDICARE

## 2025-03-19 ENCOUNTER — HOSPITAL ENCOUNTER (OUTPATIENT)
Dept: RADIOLOGY | Facility: CLINIC | Age: 75
Discharge: HOME | End: 2025-03-19
Payer: MEDICARE

## 2025-03-19 VITALS — BODY MASS INDEX: 32.63 KG/M2 | WEIGHT: 190.2 LBS

## 2025-03-19 DIAGNOSIS — M17.11 ARTHRITIS OF RIGHT KNEE: ICD-10-CM

## 2025-03-19 DIAGNOSIS — I82.4Y9 DEEP VEIN THROMBOSIS (DVT) OF PROXIMAL LOWER EXTREMITY, UNSPECIFIED CHRONICITY, UNSPECIFIED LATERALITY (MULTI): ICD-10-CM

## 2025-03-19 DIAGNOSIS — D68.61 ANTIPHOSPHOLIPID ANTIBODY SYNDROME (MULTI): Primary | ICD-10-CM

## 2025-03-19 DIAGNOSIS — I74.3 EMBOLISM AND THROMBOSIS OF ARTERIES OF THE LOWER EXTREMITIES (MULTI): ICD-10-CM

## 2025-03-19 DIAGNOSIS — I82.90 DEEP VEIN THROMBOSIS (DVT) OF NON-EXTREMITY VEIN, UNSPECIFIED CHRONICITY: ICD-10-CM

## 2025-03-19 DIAGNOSIS — I26.99 PULMONARY EMBOLISM, UNSPECIFIED CHRONICITY, UNSPECIFIED PULMONARY EMBOLISM TYPE, UNSPECIFIED WHETHER ACUTE COR PULMONALE PRESENT (MULTI): ICD-10-CM

## 2025-03-19 DIAGNOSIS — M25.561 RIGHT KNEE PAIN, UNSPECIFIED CHRONICITY: ICD-10-CM

## 2025-03-19 LAB
POC INR: 3.9
POC PROTHROMBIN TIME: NORMAL

## 2025-03-19 PROCEDURE — 1159F MED LIST DOCD IN RCRD: CPT | Performed by: SPECIALIST

## 2025-03-19 PROCEDURE — 1125F AMNT PAIN NOTED PAIN PRSNT: CPT | Performed by: SPECIALIST

## 2025-03-19 PROCEDURE — 1036F TOBACCO NON-USER: CPT | Performed by: SPECIALIST

## 2025-03-19 PROCEDURE — 85610 PROTHROMBIN TIME: CPT | Mod: QW

## 2025-03-19 PROCEDURE — 73562 X-RAY EXAM OF KNEE 3: CPT | Mod: RIGHT SIDE | Performed by: RADIOLOGY

## 2025-03-19 PROCEDURE — 99211 OFF/OP EST MAY X REQ PHY/QHP: CPT

## 2025-03-19 PROCEDURE — 1160F RVW MEDS BY RX/DR IN RCRD: CPT | Performed by: SPECIALIST

## 2025-03-19 PROCEDURE — 99214 OFFICE O/P EST MOD 30 MIN: CPT | Performed by: SPECIALIST

## 2025-03-19 PROCEDURE — 73562 X-RAY EXAM OF KNEE 3: CPT | Mod: RT

## 2025-03-19 ASSESSMENT — PAIN DESCRIPTION - DESCRIPTORS: DESCRIPTORS: ACHING;DULL

## 2025-03-19 ASSESSMENT — PAIN - FUNCTIONAL ASSESSMENT: PAIN_FUNCTIONAL_ASSESSMENT: 0-10

## 2025-03-19 ASSESSMENT — PAIN SCALES - GENERAL: PAINLEVEL_OUTOF10: 2

## 2025-03-19 NOTE — ASSESSMENT & PLAN NOTE
Right knee contusion with aggravation of osteoarthritis medial joint line    Plan:  Patient states that she uses a compounding cream that nurse practitioner Manuela Hansen prescribed.  This is mainly for some neuritic pain she has medially that secondary to a procedure she had with Dr. Iqbal years ago.  With respect to her knee arthritis the Euflexxa and cortisone injections have been quite helpful and she is at a good baseline.  Follow-up in 3 months for reevaluation with me.  Ultrasound was performed and I reviewed the results with her in real-time.

## 2025-03-19 NOTE — PROGRESS NOTES
Assessment/Plan   Encounter Diagnoses:  Right knee pain, unspecified chronicity    Arthritis of right knee  Arthritis of right knee  Right knee contusion with aggravation of osteoarthritis medial joint line    Plan:  Patient states that she uses a compounding cream that nurse practitioner Manuela Hansen prescribed.  This is mainly for some neuritic pain she has medially that secondary to a procedure she had with Dr. Iqbal years ago.  With respect to her knee arthritis the Euflexxa and cortisone injections have been quite helpful and she is at a good baseline.  Follow-up in 3 months for reevaluation with me.  Ultrasound was performed and I reviewed the results with her in real-time.       Subjective    Patient ID: Marine Luna is a 74 y.o. female.    Chief Complaint: Follow-up of the Right Knee     Last Surgery: No surgery found  Last Surgery Date: No surgery found    HPI  74-year-old female comes in today for reevaluation of her right knee.  She has some neuritic pain that is well-managed by compounding cream which we can refill for her.  The Euflexxa and cortisone injections have helped manage her knee arthritis and she is at a good baseline for this.    OBJECTIVE: ORTHO EXAM  Right knee exam  She had some bruising laterally where she states she bumped her knee against a wooden cabinet.  This is resolving and was not a major trauma for her.  Medially she has some joint pain in the area of the prior injury.  Based on the x-rays today she may have had some avulsion fracture in that area or compression.  She demonstrates full extension and flexion to 120  Her thigh is supple and nontender.  Her calves are supple and nontender bilaterally.  He is neurovascularly intact distally.  With valgus stress she corrects to neutral or 5 degrees of valgus  On standing she has about 15 degrees of varus.        IMAGE RESULTS:  Point of Care Ultrasound  These images are not reportable by radiology and will not be  interpreted   by  Radiologists.      ULTRASOUND  DIAGNOSTIC ULTRASOUND REPORT FINAL: Right KNEE  Sonographer: Tay Frank MD  Indication: Knee Pain  Procedure: Ultrasound, extremity, nonvascular, real-time, COMPLETE, anatomic specific  Technique: B-Mode Ultrasound Examination performed using 6- 9 MHz linear transducer with [employer]  STUDY TYPE:   1. ULTRASOUND EXTREMITY  2. REAL TIME WITH IMAGE DOCUMENTATION  3. NON-VASCULAR  4. COMPLETE STUDY, INCLUDING BUT NOT LIMITED TO MUSCLE, TENDONS, LIGAMENTS, SOFT TISSUES, ADIPOSE TISSUE AND SUBCUTANEOUS TISSUE.  Site: KNEE   Live ultrasound was performed with of patient's  KNEE and PERMANENTLY documented. I personally performed the ultrasound and reviewed the findings. These show:    Rosy-articular evaluation:   An intact Quadriceps Tendon with the Quadriceps Muscle fibers showing normal striations Quadriceps Tendon demonstrating normal fibrillar pattern. . The Patellar Tendon demonstrates normal fibrillar pattern and is intact.   No significant soft tissue fluid collection/abscess appreciated.     Joint Evaluation:  The lateral joint line shows an intact LCL.   Medial joint line exam shows an intact MCL.   The patellar tendon was within normal limits.  Scant joint effusion noted.     The patient tolerated the procedure well.        Procedures     Orders Placed This Encounter    XR knee right 3 views    Point of Care Ultrasound

## 2025-03-19 NOTE — PROGRESS NOTES
Pt enrolled in [facility] for management of Antiphospholipid antibody syndrome (Multi) [D68.61].     Pt current location in clinic.     Current anticoagulant: Warfarin    Time since last visit: 2 weeks    Last INR: 3.3 on warfarin 37.5 mg in the previous week. Pt was instructed to  take 5mg x1, then resume usual dosing. at last visit.    Last Creatinine:   Lab Results   Component Value Date    CREATININE 1.01 12/17/2024     Last hemoglobin/hematocrit:  Lab Results   Component Value Date    HGB 13.5 12/17/2024     Lab Results   Component Value Date    HCT 42.5 12/17/2024       Current INR: 3.9 is SUPRAtherapeutic for goal range of 2.0-3.0 and is reflective of 37.5 mg in the previous week prior to visit.    Dosing confirmed with patient  Patient missed Monday the 17th  Patient denies any medication changes, diet changes, or OTC/herbal supplement changes since last visit.  Patient denies any adverse reactions or barriers.  Patient denies any CP/SOB, fatigue, bleeding or bruising since last visit.   Patient denies any change in alcohol or tobacco use since last visit.   Patient denies any upcoming medical or dental procedures.    PT has had malaise and not been feeling well the past week or so. Thinks she has a general illness    Plan:  Patient was instructed to  hold warfarin today, then start new dose of 5mg daily.  .  INR follow up will occur in 2 weeks.  Patient was instructed to maintain consistent vegetable intake, to monitor for any bruising or bleeding, and to call with any medication changes or concerns.    Pt handout given with above information    Jose Del Rio, MicheleD  P:[phone]  F:[phone]

## 2025-04-02 ENCOUNTER — ANTICOAGULATION - WARFARIN VISIT (OUTPATIENT)
Dept: PHARMACY | Facility: HOSPITAL | Age: 75
End: 2025-04-02
Payer: MEDICARE

## 2025-04-02 DIAGNOSIS — I74.3 EMBOLISM AND THROMBOSIS OF ARTERIES OF THE LOWER EXTREMITIES: ICD-10-CM

## 2025-04-02 DIAGNOSIS — D68.61 ANTIPHOSPHOLIPID ANTIBODY SYNDROME (MULTI): Primary | ICD-10-CM

## 2025-04-02 DIAGNOSIS — I82.4Y9 DEEP VEIN THROMBOSIS (DVT) OF PROXIMAL LOWER EXTREMITY, UNSPECIFIED CHRONICITY, UNSPECIFIED LATERALITY: ICD-10-CM

## 2025-04-02 DIAGNOSIS — I26.99 PULMONARY EMBOLISM, UNSPECIFIED CHRONICITY, UNSPECIFIED PULMONARY EMBOLISM TYPE, UNSPECIFIED WHETHER ACUTE COR PULMONALE PRESENT (MULTI): ICD-10-CM

## 2025-04-02 DIAGNOSIS — I82.90 DEEP VEIN THROMBOSIS (DVT) OF NON-EXTREMITY VEIN, UNSPECIFIED CHRONICITY: ICD-10-CM

## 2025-04-02 LAB
POC INR: 2.1
POC PROTHROMBIN TIME: NORMAL

## 2025-04-02 PROCEDURE — 99211 OFF/OP EST MAY X REQ PHY/QHP: CPT

## 2025-04-02 PROCEDURE — 85610 PROTHROMBIN TIME: CPT | Mod: QW

## 2025-04-02 NOTE — PROGRESS NOTES
Pt enrolled in [facility] for management of Antiphospholipid antibody syndrome (Multi) [D68.61].     Pt current location in clinic.     Current anticoagulant: Warfarin    Time since last visit: 3 weeks    Last INR: 3.9 on warfarin 32.5 mg in the previous week. Pt was instructed to hold warfarin today, then start new dose of 5mg daily.  . at last visit.    Last Creatinine:   Lab Results   Component Value Date    CREATININE 1.01 12/17/2024     Last hemoglobin/hematocrit:  Lab Results   Component Value Date    HGB 13.5 12/17/2024     Lab Results   Component Value Date    HCT 42.5 12/17/2024       Current INR: 2.1 is therapeutic for goal range of 2.0-3.0 and is reflective of 35 mg in the previous week prior to visit.    Dosing confirmed with patient  Patient denies any missed doses.  Patient denies any medication changes, diet changes, or OTC/herbal supplement changes since last visit.  Patient denies any adverse reactions or barriers.  Patient denies any CP/SOB, fatigue, bleeding or bruising since last visit.   Patient denies any change in alcohol or tobacco use since last visit.   Patient denies any upcoming medical or dental procedures.    Pt still in a significant amount of pain from tooth pain, she will have an implant done on may 6, she will likely not need to hold her warfarin, but she will ask.     Plan:  Patient was instructed to continue with current warfarin dose.  INR follow up will occur in 2 weeks.  Patient was instructed to maintain consistent vegetable intake, to monitor for any bruising or bleeding, and to call with any medication changes or concerns.    Pt handout given with above information    Jose Del Rio, MicheleD  P:[phone]  F:[phone]

## 2025-04-16 ENCOUNTER — ANTICOAGULATION - WARFARIN VISIT (OUTPATIENT)
Dept: PHARMACY | Facility: HOSPITAL | Age: 75
End: 2025-04-16
Payer: MEDICARE

## 2025-04-16 DIAGNOSIS — I26.99 PULMONARY EMBOLISM, UNSPECIFIED CHRONICITY, UNSPECIFIED PULMONARY EMBOLISM TYPE, UNSPECIFIED WHETHER ACUTE COR PULMONALE PRESENT (MULTI): ICD-10-CM

## 2025-04-16 DIAGNOSIS — I74.3 EMBOLISM AND THROMBOSIS OF ARTERIES OF THE LOWER EXTREMITIES: ICD-10-CM

## 2025-04-16 DIAGNOSIS — D68.61 ANTIPHOSPHOLIPID ANTIBODY SYNDROME (MULTI): Primary | ICD-10-CM

## 2025-04-16 DIAGNOSIS — I82.4Y9 DEEP VEIN THROMBOSIS (DVT) OF PROXIMAL LOWER EXTREMITY, UNSPECIFIED CHRONICITY, UNSPECIFIED LATERALITY: ICD-10-CM

## 2025-04-16 DIAGNOSIS — I82.90 DEEP VEIN THROMBOSIS (DVT) OF NON-EXTREMITY VEIN, UNSPECIFIED CHRONICITY: ICD-10-CM

## 2025-04-16 LAB
POC INR: 3.2 (ref 0.9–1.1)
POC PROTHROMBIN TIME: ABNORMAL (ref 9.3–12.5)

## 2025-04-16 PROCEDURE — 85610 PROTHROMBIN TIME: CPT | Mod: QW

## 2025-04-16 PROCEDURE — 99211 OFF/OP EST MAY X REQ PHY/QHP: CPT

## 2025-04-16 NOTE — PROGRESS NOTES
Pt enrolled in [facility] for management of Antiphospholipid antibody syndrome (Multi) [D68.61].     Pt current location in clinic.     Current anticoagulant: Warfarin    Time since last visit: 2 weeks    Last INR: 2.1 on warfarin 35 mg in the previous week. No changes were made at that time.    Last Creatinine:   Lab Results   Component Value Date    CREATININE 1.01 12/17/2024     Last hemoglobin/hematocrit:  Lab Results   Component Value Date    HGB 13.5 12/17/2024     Lab Results   Component Value Date    HCT 42.5 12/17/2024       Current INR: 3.2 is SUPRAtherapeutic for goal range of 2.0-3.0 and is reflective of 35 mg in the previous week prior to visit.    Dosing confirmed with patient  Patient denies any missed doses.  Patient denies any medication changes, diet changes, or OTC/herbal supplement changes since last visit.  Patient denies any adverse reactions or barriers.  Patient denies any CP/SOB, fatigue, bleeding or bruising since last visit.   Patient denies any change in alcohol or tobacco use since last visit.   Patient denies any upcoming medical or dental procedures.    Plan:  Patient was instructed to  1/2 tablet tonight, then resume normal dosing  .  INR follow up will occur in 2 weeks.  Patient was instructed to maintain consistent vegetable intake, to monitor for any bruising or bleeding, and to call with any medication changes or concerns.    Pt handout given with above information    Jose Del Rio, PharmD  P:[phone]  F:[phone]

## 2025-04-30 ENCOUNTER — ANTICOAGULATION - WARFARIN VISIT (OUTPATIENT)
Dept: PHARMACY | Facility: HOSPITAL | Age: 75
End: 2025-04-30
Payer: MEDICARE

## 2025-04-30 DIAGNOSIS — I26.99 PULMONARY EMBOLISM, UNSPECIFIED CHRONICITY, UNSPECIFIED PULMONARY EMBOLISM TYPE, UNSPECIFIED WHETHER ACUTE COR PULMONALE PRESENT (MULTI): ICD-10-CM

## 2025-04-30 DIAGNOSIS — I82.4Y9 DEEP VEIN THROMBOSIS (DVT) OF PROXIMAL LOWER EXTREMITY, UNSPECIFIED CHRONICITY, UNSPECIFIED LATERALITY: ICD-10-CM

## 2025-04-30 DIAGNOSIS — I82.90 DEEP VEIN THROMBOSIS (DVT) OF NON-EXTREMITY VEIN, UNSPECIFIED CHRONICITY: ICD-10-CM

## 2025-04-30 DIAGNOSIS — D68.61 ANTIPHOSPHOLIPID ANTIBODY SYNDROME (MULTI): Primary | ICD-10-CM

## 2025-04-30 DIAGNOSIS — I74.3 EMBOLISM AND THROMBOSIS OF ARTERIES OF THE LOWER EXTREMITIES: ICD-10-CM

## 2025-04-30 LAB
POC INR: 2 (ref 0.9–1.1)
POC PROTHROMBIN TIME: ABNORMAL (ref 9.3–12.5)

## 2025-04-30 PROCEDURE — 99211 OFF/OP EST MAY X REQ PHY/QHP: CPT | Performed by: PHARMACIST

## 2025-04-30 PROCEDURE — 85610 PROTHROMBIN TIME: CPT | Mod: QW

## 2025-04-30 NOTE — PROGRESS NOTES
Pt enrolled in [facility] for management of Antiphospholipid antibody syndrome (Multi) [D68.61].     Pt current location in clinic.     Current anticoagulant: Warfarin    Time since last visit: 2 weeks    Last INR: 3.2 on warfarin 35 mg in the previous week. Pt was instructed to take 2.5mg once, then resume usual dosing at last visit.    Last Creatinine:   Lab Results   Component Value Date    CREATININE 1.01 12/17/2024     Last hemoglobin/hematocrit:  Lab Results   Component Value Date    HGB 13.5 12/17/2024     Lab Results   Component Value Date    HCT 42.5 12/17/2024       Current INR: 2.0 is therapeutic for goal range of 2.0-3.0 and is reflective of 35 mg in the previous week prior to visit.    Dosing confirmed with patient  Patient denies any missed doses.  Patient denies any medication changes, diet changes, or OTC/herbal supplement changes since last visit.  Patient denies any adverse reactions or barriers.  Patient denies any CP/SOB, fatigue, bleeding or bruising since last visit.   Patient denies any change in alcohol or tobacco use since last visit.   Patient denies any upcoming medical or dental procedures.    Plan:  Patient was instructed to continue with current warfarin dose.  INR follow up will occur in 4 weeks.  Patient was instructed to maintain consistent vegetable intake, to monitor for any bruising or bleeding, and to call with any medication changes or concerns.    Pt handout given with above information    Vick Tabares, PharmD  P:[phone]  F:[phone]

## 2025-05-20 ENCOUNTER — TELEPHONE (OUTPATIENT)
Dept: HEMATOLOGY/ONCOLOGY | Facility: CLINIC | Age: 75
End: 2025-05-20
Payer: MEDICARE

## 2025-05-22 RX ORDER — WARFARIN SODIUM 5 MG/1
TABLET ORAL
Qty: 135 TABLET | Refills: 3 | Status: SHIPPED | OUTPATIENT
Start: 2025-05-22 | End: 2026-01-17

## 2025-05-28 ENCOUNTER — APPOINTMENT (OUTPATIENT)
Dept: PHARMACY | Facility: HOSPITAL | Age: 75
End: 2025-05-28
Payer: MEDICARE

## 2025-05-28 DIAGNOSIS — I82.90 DEEP VEIN THROMBOSIS (DVT) OF NON-EXTREMITY VEIN, UNSPECIFIED CHRONICITY: ICD-10-CM

## 2025-05-28 DIAGNOSIS — I74.3 EMBOLISM AND THROMBOSIS OF ARTERIES OF THE LOWER EXTREMITIES: ICD-10-CM

## 2025-05-28 DIAGNOSIS — I26.99 PULMONARY EMBOLISM, UNSPECIFIED CHRONICITY, UNSPECIFIED PULMONARY EMBOLISM TYPE, UNSPECIFIED WHETHER ACUTE COR PULMONALE PRESENT (MULTI): ICD-10-CM

## 2025-05-28 DIAGNOSIS — I82.4Y9 DEEP VEIN THROMBOSIS (DVT) OF PROXIMAL LOWER EXTREMITY, UNSPECIFIED CHRONICITY, UNSPECIFIED LATERALITY: ICD-10-CM

## 2025-05-28 DIAGNOSIS — Z86.711 PERSONAL HISTORY OF PE (PULMONARY EMBOLISM): ICD-10-CM

## 2025-05-28 DIAGNOSIS — D68.61 ANTIPHOSPHOLIPID ANTIBODY SYNDROME (MULTI): Primary | ICD-10-CM

## 2025-05-28 LAB
POC INR: 2.9 (ref 0.9–1.1)
POC PROTHROMBIN TIME: ABNORMAL (ref 9.3–12.5)

## 2025-05-28 PROCEDURE — 85610 PROTHROMBIN TIME: CPT | Mod: QW

## 2025-05-28 PROCEDURE — 99211 OFF/OP EST MAY X REQ PHY/QHP: CPT

## 2025-05-28 NOTE — PROGRESS NOTES
Pt enrolled in [facility] for management of Antiphospholipid antibody syndrome (Multi) [D68.61].     Pt current location in clinic.     Current anticoagulant: Warfarin    Time since last visit: 4 weeks    Last INR: 2.00 on warfarin 35 mg in the previous week. No changes were made at that time.    Last Creatinine:   Lab Results   Component Value Date    CREATININE 1.01 12/17/2024     Last hemoglobin/hematocrit:  Lab Results   Component Value Date    HGB 13.5 12/17/2024     Lab Results   Component Value Date    HCT 42.5 12/17/2024       Current INR: 2.90 is therapeutic for goal range of 2.0-3.0 and is reflective of 35 mg in the previous week prior to visit.    Dosing confirmed with patient  Patient denies any missed doses.  Patient denies any medication changes, diet changes, or OTC/herbal supplement changes since last visit.  Patient denies any adverse reactions or barriers.  Patient denies any CP/SOB, fatigue, bleeding or bruising since last visit.   Patient denies any change in alcohol or tobacco use since last visit.   Patient denies any upcoming medical or dental procedures.    Plan:  Patient was instructed to continue with current warfarin dose.  INR follow up will occur in 4 weeks.  Patient was instructed to maintain consistent vegetable intake, to monitor for any bruising or bleeding, and to call with any medication changes or concerns.    Pt handout given with above information    Papo Holden PharmD BCPS  P:[phone]  F:[phone]

## 2025-06-03 ENCOUNTER — APPOINTMENT (OUTPATIENT)
Dept: PRIMARY CARE | Facility: CLINIC | Age: 75
End: 2025-06-03
Payer: MEDICARE

## 2025-06-03 VITALS
HEIGHT: 64 IN | WEIGHT: 190.3 LBS | BODY MASS INDEX: 32.49 KG/M2 | DIASTOLIC BLOOD PRESSURE: 68 MMHG | SYSTOLIC BLOOD PRESSURE: 113 MMHG | HEART RATE: 63 BPM

## 2025-06-03 DIAGNOSIS — E66.01 MORBID (SEVERE) OBESITY DUE TO EXCESS CALORIES (MULTI): ICD-10-CM

## 2025-06-03 DIAGNOSIS — I74.3 EMBOLISM AND THROMBOSIS OF ARTERIES OF THE LOWER EXTREMITIES: ICD-10-CM

## 2025-06-03 DIAGNOSIS — F33.1 MODERATE EPISODE OF RECURRENT MAJOR DEPRESSIVE DISORDER: ICD-10-CM

## 2025-06-03 DIAGNOSIS — I73.9 PAD (PERIPHERAL ARTERY DISEASE): ICD-10-CM

## 2025-06-03 DIAGNOSIS — I11.9 HYPERTENSIVE HEART DISEASE, UNSPECIFIED WHETHER HEART FAILURE PRESENT: ICD-10-CM

## 2025-06-03 DIAGNOSIS — N18.31 STAGE 3A CHRONIC KIDNEY DISEASE (MULTI): ICD-10-CM

## 2025-06-03 RX ORDER — ROSUVASTATIN CALCIUM 5 MG/1
2.5 TABLET, COATED ORAL DAILY
Qty: 45 TABLET | Refills: 3 | Status: SHIPPED | OUTPATIENT
Start: 2025-06-03

## 2025-06-03 RX ORDER — LOSARTAN POTASSIUM 25 MG/1
25 TABLET ORAL DAILY
Qty: 90 TABLET | Refills: 3 | Status: SHIPPED | OUTPATIENT
Start: 2025-06-03 | End: 2026-06-03

## 2025-06-03 ASSESSMENT — ENCOUNTER SYMPTOMS
COUGH: 0
LOSS OF SENSATION IN FEET: 1
NAUSEA: 0
HEADACHES: 0
CONSTIPATION: 0
SHORTNESS OF BREATH: 0
FEVER: 0
DYSURIA: 0
VOMITING: 0
LIGHT-HEADEDNESS: 0
DIARRHEA: 0
ARTHRALGIAS: 1
PALPITATIONS: 0
DEPRESSION: 0
CHILLS: 0
OCCASIONAL FEELINGS OF UNSTEADINESS: 0
FATIGUE: 0

## 2025-06-03 ASSESSMENT — ACTIVITIES OF DAILY LIVING (ADL)
TAKING_MEDICATION: INDEPENDENT
GROCERY_SHOPPING: INDEPENDENT
MANAGING_FINANCES: INDEPENDENT
BATHING: INDEPENDENT
DOING_HOUSEWORK: INDEPENDENT
DRESSING: INDEPENDENT

## 2025-06-03 NOTE — PROGRESS NOTES
"Subjective   Reason for Visit: Marine Luna is an 75 y.o. female here for a Medicare Wellness visit.     Past Medical, Surgical, and Family History reviewed and updated in chart.    Reviewed all medications by prescribing practitioner or clinical pharmacist (such as prescriptions, OTCs, herbal therapies and supplements) and documented in the medical record.    HPI  Here today to establish with me and for MAWV.       Patient Care Team:  ABRAM Contreras-CNP as PCP - General (Family Medicine)  MICHAEL Stephen as PCP [place] [facility] [place] Provider     Review of Systems   Constitutional:  Negative for chills, fatigue and fever.   Respiratory:  Negative for cough and shortness of breath.    Cardiovascular:  Negative for chest pain, palpitations and leg swelling.   Gastrointestinal:  Negative for constipation, diarrhea, nausea and vomiting.   Genitourinary:  Negative for dysuria.   Musculoskeletal:  Positive for arthralgias.   Neurological:  Negative for light-headedness and headaches.       Objective   Vitals:  /68 (Patient Position: Sitting)   Pulse 63   Ht 1.626 m (5' 4\")   Wt 86.3 kg (190 lb 4.8 oz)   LMP  (LMP Unknown)   BMI 32.66 kg/m²       Physical Exam  Cardiovascular:      Rate and Rhythm: Normal rate and regular rhythm.      Heart sounds: Normal heart sounds.   Pulmonary:      Breath sounds: Normal breath sounds.   Musculoskeletal:      Right lower leg: No edema.      Left lower leg: No edema.   Skin:     Capillary Refill: Capillary refill takes less than 2 seconds.   Neurological:      Mental Status: She is alert and oriented to person, place, and time.         Assessment & Plan  Hypertensive heart disease, unspecified whether heart failure present    Orders:    losartan (Cozaar) 25 mg tablet; Take 1 tablet (25 mg) by mouth once daily.  -CBC CMP ordered  -Refill losartan and rosuvastatin   -Continue coreg 3.125mg    PAD (peripheral artery disease)    Orders:    rosuvastatin " (Crestor) 5 mg tablet; Take 0.5 tablets (2.5 mg) by mouth once daily.  -Lipid panel ordered   -ASA 81 mg daily   -Follows with coag clinic and INR   Embolism and thrombosis of arteries of the lower extremities  -Coumadin 5mg daily   -Following with coumadin clinic       Moderate episode of recurrent major depressive disorder  -Continue wellbutrin 150 mg daily   -Continue prozac 20 mg daily          Morbid (severe) obesity due to excess calories (Multi)  -Continue diet and exercise   -Continue wellbutrin 150mg daily        Stage 3a chronic kidney disease (Multi)          Health maintenance  -Lab work ordered   -Mammo done 1/25   -Colonoscopy done 2024      Advance Directives Discussion  16 - 20 minutes were spent discussing Advanced Care Planning (including a Living Will, Medical Power Of , as well as specific end of life choices and/or directives). The details of that discussion were documented in Advanced Directives Discussion section of the medical record.     Depression Screening  5 - 10 minutes were spent screening for depression.

## 2025-06-03 NOTE — ASSESSMENT & PLAN NOTE
Orders:    rosuvastatin (Crestor) 5 mg tablet; Take 0.5 tablets (2.5 mg) by mouth once daily.  -Lipid panel ordered   -ASA 81 mg daily   -Follows with coag clinic and INR

## 2025-06-12 ENCOUNTER — APPOINTMENT (OUTPATIENT)
Dept: PRIMARY CARE | Facility: CLINIC | Age: 75
End: 2025-06-12
Payer: MEDICARE

## 2025-06-18 ENCOUNTER — HOSPITAL ENCOUNTER (OUTPATIENT)
Dept: RADIOLOGY | Facility: CLINIC | Age: 75
Discharge: HOME | End: 2025-06-18
Payer: MEDICARE

## 2025-06-18 ENCOUNTER — APPOINTMENT (OUTPATIENT)
Dept: ORTHOPEDIC SURGERY | Facility: CLINIC | Age: 75
End: 2025-06-18
Payer: MEDICARE

## 2025-06-18 ENCOUNTER — LAB (OUTPATIENT)
Dept: LAB | Facility: HOSPITAL | Age: 75
End: 2025-06-18
Payer: MEDICARE

## 2025-06-18 ENCOUNTER — HOSPITAL ENCOUNTER (OUTPATIENT)
Dept: RADIOLOGY | Facility: EXTERNAL LOCATION | Age: 75
Discharge: HOME | End: 2025-06-18

## 2025-06-18 ENCOUNTER — APPOINTMENT (OUTPATIENT)
Dept: PRIMARY CARE | Facility: CLINIC | Age: 75
End: 2025-06-18
Payer: MEDICARE

## 2025-06-18 DIAGNOSIS — M25.571 RIGHT ANKLE PAIN, UNSPECIFIED CHRONICITY: ICD-10-CM

## 2025-06-18 DIAGNOSIS — M79.671 RIGHT FOOT PAIN: ICD-10-CM

## 2025-06-18 DIAGNOSIS — M17.11 ARTHRITIS OF RIGHT KNEE: ICD-10-CM

## 2025-06-18 DIAGNOSIS — I82.90 DEEP VEIN THROMBOSIS (DVT) OF NON-EXTREMITY VEIN, UNSPECIFIED CHRONICITY: ICD-10-CM

## 2025-06-18 LAB
ALBUMIN SERPL BCP-MCNC: 4.1 G/DL (ref 3.4–5)
ALP SERPL-CCNC: 51 U/L (ref 33–136)
ALT SERPL W P-5'-P-CCNC: 18 U/L (ref 7–45)
ANION GAP SERPL CALC-SCNC: 13 MMOL/L (ref 10–20)
AST SERPL W P-5'-P-CCNC: 20 U/L (ref 9–39)
BASOPHILS # BLD AUTO: 0.05 X10*3/UL (ref 0–0.1)
BASOPHILS NFR BLD AUTO: 1 %
BILIRUB SERPL-MCNC: 0.4 MG/DL (ref 0–1.2)
BUN SERPL-MCNC: 19 MG/DL (ref 6–23)
CALCIUM SERPL-MCNC: 9.5 MG/DL (ref 8.6–10.3)
CHLORIDE SERPL-SCNC: 106 MMOL/L (ref 98–107)
CO2 SERPL-SCNC: 27 MMOL/L (ref 21–32)
CREAT SERPL-MCNC: 0.91 MG/DL (ref 0.5–1.05)
EGFRCR SERPLBLD CKD-EPI 2021: 67 ML/MIN/1.73M*2
EOSINOPHIL # BLD AUTO: 0.35 X10*3/UL (ref 0–0.4)
EOSINOPHIL NFR BLD AUTO: 7.2 %
ERYTHROCYTE [DISTWIDTH] IN BLOOD BY AUTOMATED COUNT: 14.1 % (ref 11.5–14.5)
GLUCOSE SERPL-MCNC: 98 MG/DL (ref 74–99)
HCT VFR BLD AUTO: 38 % (ref 36–46)
HGB BLD-MCNC: 12.1 G/DL (ref 12–16)
IMM GRANULOCYTES # BLD AUTO: 0.01 X10*3/UL (ref 0–0.5)
IMM GRANULOCYTES NFR BLD AUTO: 0.2 % (ref 0–0.9)
LYMPHOCYTES # BLD AUTO: 1.52 X10*3/UL (ref 0.8–3)
LYMPHOCYTES NFR BLD AUTO: 31.3 %
MCH RBC QN AUTO: 31 PG (ref 26–34)
MCHC RBC AUTO-ENTMCNC: 31.8 G/DL (ref 32–36)
MCV RBC AUTO: 97 FL (ref 80–100)
MONOCYTES # BLD AUTO: 0.52 X10*3/UL (ref 0.05–0.8)
MONOCYTES NFR BLD AUTO: 10.7 %
NEUTROPHILS # BLD AUTO: 2.41 X10*3/UL (ref 1.6–5.5)
NEUTROPHILS NFR BLD AUTO: 49.6 %
NRBC BLD-RTO: 0 /100 WBCS (ref 0–0)
PLATELET # BLD AUTO: 310 X10*3/UL (ref 150–450)
POTASSIUM SERPL-SCNC: 4.6 MMOL/L (ref 3.5–5.3)
PROT SERPL-MCNC: 5.9 G/DL (ref 6.4–8.2)
RBC # BLD AUTO: 3.9 X10*6/UL (ref 4–5.2)
SODIUM SERPL-SCNC: 141 MMOL/L (ref 136–145)
WBC # BLD AUTO: 4.9 X10*3/UL (ref 4.4–11.3)

## 2025-06-18 PROCEDURE — 73630 X-RAY EXAM OF FOOT: CPT | Mod: RIGHT SIDE | Performed by: STUDENT IN AN ORGANIZED HEALTH CARE EDUCATION/TRAINING PROGRAM

## 2025-06-18 PROCEDURE — 80053 COMPREHEN METABOLIC PANEL: CPT

## 2025-06-18 PROCEDURE — 73610 X-RAY EXAM OF ANKLE: CPT | Mod: RT

## 2025-06-18 PROCEDURE — 73630 X-RAY EXAM OF FOOT: CPT | Mod: RT

## 2025-06-18 PROCEDURE — 73564 X-RAY EXAM KNEE 4 OR MORE: CPT | Mod: RIGHT SIDE | Performed by: STUDENT IN AN ORGANIZED HEALTH CARE EDUCATION/TRAINING PROGRAM

## 2025-06-18 PROCEDURE — 73564 X-RAY EXAM KNEE 4 OR MORE: CPT | Mod: RT

## 2025-06-18 PROCEDURE — 85025 COMPLETE CBC W/AUTO DIFF WBC: CPT

## 2025-06-18 PROCEDURE — 73610 X-RAY EXAM OF ANKLE: CPT | Mod: RIGHT SIDE | Performed by: STUDENT IN AN ORGANIZED HEALTH CARE EDUCATION/TRAINING PROGRAM

## 2025-06-18 RX ORDER — TRIAMCINOLONE ACETONIDE 40 MG/ML
40 INJECTION, SUSPENSION INTRA-ARTICULAR; INTRAMUSCULAR
Status: COMPLETED | OUTPATIENT
Start: 2025-06-18 | End: 2025-06-18

## 2025-06-18 RX ADMIN — TRIAMCINOLONE ACETONIDE 40 MG: 40 INJECTION, SUSPENSION INTRA-ARTICULAR; INTRAMUSCULAR at 11:16

## 2025-06-18 ASSESSMENT — PAIN SCALES - GENERAL: PAINLEVEL_OUTOF10: 8

## 2025-06-18 ASSESSMENT — PAIN DESCRIPTION - DESCRIPTORS: DESCRIPTORS: ACHING;DULL;THROBBING

## 2025-06-18 ASSESSMENT — PAIN - FUNCTIONAL ASSESSMENT: PAIN_FUNCTIONAL_ASSESSMENT: 0-10

## 2025-06-18 NOTE — ASSESSMENT & PLAN NOTE
Right knee osteoarthritis medial joint line  Right ankle grade 1 anterior talofibular ligament sprain which is healing well.    Plan:  Patient states that she uses a compounding cream that nurse practitioner Manuela Hansen prescribed.  This is mainly for some neuritic pain she has medially that secondary to a procedure she had with Dr. Iqbal years ago.  Under ultrasound control 40 mg of Kenalog was injected into the right knee.  She had an excellent lidocaine suppression test.  Follow-up in 3 months for reevaluation with me.

## 2025-06-18 NOTE — PROGRESS NOTES
Assessment/Plan   Encounter Diagnoses:  Arthritis of right knee  Arthritis of right knee  Right knee osteoarthritis medial joint line  Right ankle grade 1 anterior talofibular ligament sprain which is healing well.    Plan:  Patient states that she uses a compounding cream that nurse practitioner Manuela Hansen prescribed.  This is mainly for some neuritic pain she has medially that secondary to a procedure she had with Dr. Iqbal years ago.  Under ultrasound control 40 mg of Kenalog was injected into the right knee.  She had an excellent lidocaine suppression test.  Follow-up in 3 months for reevaluation with me.         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    Chief Complaint: Follow-up of the Right Knee (XX-RAYS  6-18-25/INJ 11-20-24)     Last Surgery: No surgery found  Last Surgery Date: No surgery found    HPI  75-year-old female comes in today for evaluation of her right knee.  She has arthritic changes with joint space narrowing medially.  She wants to consider a cortisone injection.  She did well with a viscosupplementation series and we could repeat this if we can get approval for it.  She also states that she injured her right ankle few weeks ago.  She may have rolled it.  She does not recall the specifics very well.  She points to the anterior talofibular ligament as the area where she has most of her pain.    OBJECTIVE: ORTHO EXAM  Right ankle exam  Minimal to mild tenderness over the anterior talofibular ligament.  Lachman's is negative anterior drawer is negative  She has minimal swelling.  There is no bruising.  Full range of motion.  She is otherwise nontender.  Neurovascularly intact distally.    Right knee exam  Scant effusion.  0 to 120 degrees range of motion.  Tender along the medial joint line.  Her ligamentous evaluation is unchanged.  Thigh is supple and nontender.  Her calves are supple and nontender bilaterally.    X-rays of the knee show varus arthritic  changes.    X-rays of the ankle show some coarse thickening of the trabeculae in the distal tibia just above the plafond.  This could be secondary to arthritic changes or it may represent a focus of pagetoid change.  No acute bony injury is seen.    IMAGE RESULTS:  Point of Care Ultrasound  These images are not reportable by radiology and will not be interpreted   by  Radiologists.      ULTRASOUND      L Inj/Asp: R knee on 6/18/2025 11:16 AM  Indications: pain  Details: 18 G needle, ultrasound-guided superolateral approach  Medications: 40 mg triamcinolone acetonide 40 mg/mL  Procedure, treatment alternatives, risks and benefits explained, specific risks discussed. Consent was given by the patient. Immediately prior to procedure a time out was called to verify the correct patient, procedure, equipment, support staff and site/side marked as required. Patient was prepped and draped in the usual sterile fashion.            Orders Placed This Encounter    XR knee right 4+ views    Point of Care Ultrasound

## 2025-06-19 ENCOUNTER — OFFICE VISIT (OUTPATIENT)
Dept: HEMATOLOGY/ONCOLOGY | Facility: CLINIC | Age: 75
End: 2025-06-19
Payer: MEDICARE

## 2025-06-19 ENCOUNTER — TELEPHONE (OUTPATIENT)
Dept: ORTHOPEDIC SURGERY | Facility: CLINIC | Age: 75
End: 2025-06-19
Payer: MEDICARE

## 2025-06-19 VITALS
DIASTOLIC BLOOD PRESSURE: 73 MMHG | HEART RATE: 58 BPM | BODY MASS INDEX: 32.61 KG/M2 | WEIGHT: 190 LBS | OXYGEN SATURATION: 98 % | TEMPERATURE: 97 F | SYSTOLIC BLOOD PRESSURE: 150 MMHG | RESPIRATION RATE: 20 BRPM

## 2025-06-19 DIAGNOSIS — I82.90 DEEP VEIN THROMBOSIS (DVT) OF NON-EXTREMITY VEIN, UNSPECIFIED CHRONICITY: ICD-10-CM

## 2025-06-19 PROCEDURE — 1126F AMNT PAIN NOTED NONE PRSNT: CPT

## 2025-06-19 PROCEDURE — 99214 OFFICE O/P EST MOD 30 MIN: CPT

## 2025-06-19 PROCEDURE — 1159F MED LIST DOCD IN RCRD: CPT

## 2025-06-19 ASSESSMENT — PAIN SCALES - GENERAL: PAINLEVEL_OUTOF10: 0-NO PAIN

## 2025-06-19 NOTE — PROGRESS NOTES
Pt set up for 1 year followup 6/18/26 at 2pm  Instructed to get labs the week prior  Reviewed AVS with patient- patient verbalizes understanding

## 2025-06-19 NOTE — PATIENT INSTRUCTIONS
Discussed and reviewed medical history  Counts remain stable H/H-12.1/38.0, remainder of CBC and CMP are stable  PT/INRs have been stable  Encouraged to continue on coumadin as prescribed from the Anticoagulation Clinic  Return to see APRN in 12 months with labs prior

## 2025-06-19 NOTE — PROGRESS NOTES
Patient ID: Marine Luna is a 75 y.o. female.    Subjective    HPI  HPI     Visit Type: Follow Up Visit      History of Present Illness:      ID Statement:    MARINE LUNA is a 73 year old Female        Chief Complaint: Evaluation for PE   Interval History:    Referred by Jose C Liang MD      Reason for referral: PE      HPI  71 year old woman with hx of GERD, HL, depression/anxiety, HTN who presented to ER on 2/7/22 with back pain and abdominal pain, a CT C/A/P  showed acute bilateral PE with b/l GGO possibly infarcts, she had also dopplers of b/l LE that showed b/l acute DVTs. She was started on heparin and discharged on Eliquis, she was prior to that on Plavix and cilostazol which was stopped.      she had pain in the LE b/l, then se started having difficulty breathing, she was finally convinced to go to the hospital where she had above investigations      in 2007 he had similar symptoms, with blue toes, but at that time she had embolic event to the toe     she feels she is improved now her back pain is better and her breathing is better   her feet and legs are hurting more , attributes that to not taking plavix or cilostazol   she is active and up on her feet but has WILSON     she has been eating well,   no weight loss   tolerating eliquis well without bleeding   no fever or infections   no night sweats      she had MMG last year that was negative and will have her yearly one this   no recent pap smear   had colonoscopy in 2013      she was found to have positive APLA and was switched to coumadin      interval history - 6/19/25     Recently sprained her ankle, wearing an ace wrap for support  Recent cortisone injection in knee for chronic arthritic pain  Evaluated/managed by orthopedics    INR have returned to therapeutic levels  Warfarin was adjusted to  5mg everyday  Continues to be managed by the Anticoagulation Clinic  bruises easily     Energy is doing well, remains to stay active by doing [employer], knitting  Appetite is good, eating and drinking well   WILSON with stairs, otherwise no breathing issues   No chest pain or pressure      No hematochezia or melena  No urinary issues, no hematuria      Edema at times in ankles, knee     restless leg remains unchanged, takes sinemet for control   No swelling or LLE pain, continues to wear compression stockings           Objective    BSA: There is no height or weight on file to calculate BSA.  LMP  (LMP Unknown)      Physical Exam  Vitals and nursing note reviewed.   Constitutional:       Appearance: Normal appearance.   HENT:      Head: Normocephalic and atraumatic.      Nose: Nose normal.      Mouth/Throat:      Mouth: Mucous membranes are moist.      Pharynx: Oropharynx is clear.   Eyes:      General: No scleral icterus.     Extraocular Movements: Extraocular movements intact.      Conjunctiva/sclera: Conjunctivae normal.   Cardiovascular:      Rate and Rhythm: Normal rate and regular rhythm.      Pulses: Normal pulses.      Heart sounds: Normal heart sounds.   Pulmonary:      Effort: Pulmonary effort is normal.      Breath sounds: Normal breath sounds.   Abdominal:      Palpations: Abdomen is soft.      Tenderness: There is no abdominal tenderness.   Musculoskeletal:         General: Signs of injury (right ankle injury, wearing ace wrap) present. Normal range of motion.      Cervical back: Normal range of motion.   Skin:     General: Skin is warm and dry.   Neurological:      General: No focal deficit present.      Mental Status: She is alert and oriented to person, place, and time.   Psychiatric:         Mood and Affect: Mood normal.         Behavior: Behavior normal.         Thought Content: Thought content normal.         Judgment: Judgment normal.       Performance Status:  Asymptomatic      Assessment/Plan        1. 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 PT/INRs have mostly been with in therapeutic range up until recently which was PT- 21.5, INR - 1.8. Patient was dose adjusted accordingly by coumadin clinic.  She denies any abnormal bleeding  or bruising problems. Her D-dimer is with in normal limits. She is tolerating warfarin with no issues. Will continue to monitor pt.     Hgb is stable at 13.2. Previously discussed checking additional lab for nutritional deficiency such as iron or b12. Since her labs have normalized, will hold on any additional labs at this time. Will continue to periodically see patient for bleeding risk  assessments.      Educated patient on calling or  visit closest ER with any persistent headaches, visual disturbances, weakness, numbness, severe bleeding/bruising or any concerning signs or symptoms.  - May need to wear a medical alert bracelet stating the use of anticoagulants.  - To call and schedule appt with one of our physicians for pre-operative clearance and recommendations at least 1 month before any invasive procedures/surgeries     6/19/25- Routine follow-up today. Continues taking warfarin. PT/INRs have essentially been stable. Denies any bleeding risk or clinical concerns. Patient remains to bruise easily. Over patient is doing well.      Continue on warfarin- managed by coumadin clinic. Will continue to follow patient for periodic bleeding risk assessments.      PLAN:  Discussed and reviewed medical history  Counts remain stable H/H-12.1/38.0, remainder of CBC and CMP are stable  PT/INRs have been  stable  Encouraged to continue on coumadin as prescribed from the Anticoagulation Clinic  Return to see APRN in 12 months with labs prior            Rona Lang, ABRAM-CNP

## 2025-06-19 NOTE — TELEPHONE ENCOUNTER
PIO FOR PATIENT LETTING HER KNOW THAT HER GEL INJECTIONS HAVE BEEN APPROVED THROUGH HER INSURANCE AND TO CALL THE OFFICE TO SCHEDULE

## 2025-06-25 ENCOUNTER — ANTICOAGULATION - WARFARIN VISIT (OUTPATIENT)
Dept: PHARMACY | Facility: HOSPITAL | Age: 75
End: 2025-06-25
Payer: MEDICARE

## 2025-06-25 DIAGNOSIS — Z86.718 PERSONAL HISTORY OF VENOUS THROMBOSIS AND EMBOLUS: ICD-10-CM

## 2025-06-25 DIAGNOSIS — D68.61 ANTIPHOSPHOLIPID ANTIBODY SYNDROME (MULTI): ICD-10-CM

## 2025-06-25 DIAGNOSIS — Z86.711 PERSONAL HISTORY OF PE (PULMONARY EMBOLISM): Primary | ICD-10-CM

## 2025-06-25 LAB
POC INR: 1.5 (ref 0.9–1.1)
POC PROTHROMBIN TIME: ABNORMAL (ref 9.3–12.5)

## 2025-06-25 PROCEDURE — 85610 PROTHROMBIN TIME: CPT | Mod: QW

## 2025-06-25 PROCEDURE — 99211 OFF/OP EST MAY X REQ PHY/QHP: CPT | Performed by: PHARMACIST

## 2025-06-25 NOTE — PROGRESS NOTES
Pt enrolled in [facility] for management of Antiphospholipid antibody syndrome (Multi) [D68.61].     Pt current location in clinic.     Current anticoagulant: Warfarin    Time since last visit: 4 weeks    Last INR: 2.9 on warfarin 35 mg in the previous week. No changes were made at that time.    Last Creatinine:   Lab Results   Component Value Date    CREATININE 0.91 06/18/2025     Last hemoglobin/hematocrit:  Lab Results   Component Value Date    HGB 12.1 06/18/2025     Lab Results   Component Value Date    HCT 38.0 06/18/2025       Current INR: 1.5 is SUBtherapeutic for goal range of 2.0-3.0 and is reflective of 35 mg in the previous week prior to visit.    Dosing confirmed with patient  Patient denies any missed doses but does note that she was in [place] last 2 weeks so it is possible habits changes.   Patient denies any medication changes, or OTC/herbal supplement changes since last visit - had some diet changes while in [place].  Patient denies any adverse reactions or barriers.  Patient denies any CP/SOB, fatigue, bleeding or bruising since last visit.   Patient denies any change in alcohol or tobacco use since last visit.   Notes dental crown repair 7/1, dose not need to hold warfarin    Plan:  Patient was instructed to take 7.5mg x2 days, then resume usual dosing.  INR follow up will occur in 1 weeks.  Patient was instructed to maintain consistent vegetable intake, to monitor for any bruising or bleeding, and to call with any medication changes or concerns.    Pt handout given with above information    Vick Tabares, Heather  P:[phone]  F:[phone]

## 2025-07-02 ENCOUNTER — APPOINTMENT (OUTPATIENT)
Dept: PHARMACY | Facility: HOSPITAL | Age: 75
End: 2025-07-02
Payer: MEDICARE

## 2025-07-02 DIAGNOSIS — D68.61 ANTIPHOSPHOLIPID ANTIBODY SYNDROME (MULTI): Primary | ICD-10-CM

## 2025-07-02 DIAGNOSIS — I82.90 DEEP VEIN THROMBOSIS (DVT) OF NON-EXTREMITY VEIN, UNSPECIFIED CHRONICITY: ICD-10-CM

## 2025-07-02 DIAGNOSIS — I82.4Y9 DEEP VEIN THROMBOSIS (DVT) OF PROXIMAL LOWER EXTREMITY, UNSPECIFIED CHRONICITY, UNSPECIFIED LATERALITY: ICD-10-CM

## 2025-07-02 DIAGNOSIS — I74.3 EMBOLISM AND THROMBOSIS OF ARTERIES OF THE LOWER EXTREMITIES: ICD-10-CM

## 2025-07-02 DIAGNOSIS — I26.99 PULMONARY EMBOLISM, UNSPECIFIED CHRONICITY, UNSPECIFIED PULMONARY EMBOLISM TYPE, UNSPECIFIED WHETHER ACUTE COR PULMONALE PRESENT (MULTI): ICD-10-CM

## 2025-07-02 LAB
POC INR: 2 (ref 0.9–1.1)
POC PROTHROMBIN TIME: ABNORMAL (ref 9.3–12.5)

## 2025-07-02 PROCEDURE — 85610 PROTHROMBIN TIME: CPT | Mod: QW

## 2025-07-02 PROCEDURE — 99211 OFF/OP EST MAY X REQ PHY/QHP: CPT

## 2025-07-02 NOTE — PROGRESS NOTES
Pt enrolled in [facility] for management of Antiphospholipid antibody syndrome (Multi) [D68.61].     Pt current location in clinic.     Current anticoagulant: Warfarin    Time since last visit: 1 weeks    Last INR: 2.0 on warfarin 40 mg in the previous week. Pt was instructed to take 7.5mg x2, tehn resume normal at last visit.    Last Creatinine:   Lab Results   Component Value Date    CREATININE 0.91 06/18/2025     Last hemoglobin/hematocrit:  Lab Results   Component Value Date    HGB 12.1 06/18/2025     Lab Results   Component Value Date    HCT 38.0 06/18/2025       Current INR: 2.0 is therapeutic for goal range of 2.0-3.0 and is reflective of 40 mg in the previous week prior to visit.    Dosing confirmed with patient  Patient denies any missed doses.  Patient denies any medication changes, diet changes, or OTC/herbal supplement changes since last visit.  Patient denies any adverse reactions or barriers.  Patient denies any CP/SOB, fatigue, bleeding or bruising since last visit.   Patient denies any change in alcohol or tobacco use since last visit.   Patient denies any upcoming medical or dental procedures.    Plan:  Patient was instructed to continue with current warfarin dose.  INR follow up will occur in 2 weeks.  Patient was instructed to maintain consistent vegetable intake, to monitor for any bruising or bleeding, and to call with any medication changes or concerns.    Pt handout given with above information    Jose Del Rio, PharmD  P:[phone]  F:[phone]  
0.22

## 2025-07-16 ENCOUNTER — APPOINTMENT (OUTPATIENT)
Dept: PHARMACY | Facility: HOSPITAL | Age: 75
End: 2025-07-16
Payer: MEDICARE

## 2025-07-16 DIAGNOSIS — I74.3 EMBOLISM AND THROMBOSIS OF ARTERIES OF THE LOWER EXTREMITIES: ICD-10-CM

## 2025-07-16 DIAGNOSIS — D68.61 ANTIPHOSPHOLIPID ANTIBODY SYNDROME (MULTI): Primary | ICD-10-CM

## 2025-07-16 DIAGNOSIS — I82.90 DEEP VEIN THROMBOSIS (DVT) OF NON-EXTREMITY VEIN, UNSPECIFIED CHRONICITY: ICD-10-CM

## 2025-07-16 DIAGNOSIS — I82.4Y9 DEEP VEIN THROMBOSIS (DVT) OF PROXIMAL LOWER EXTREMITY, UNSPECIFIED CHRONICITY, UNSPECIFIED LATERALITY: ICD-10-CM

## 2025-07-16 DIAGNOSIS — I26.99 PULMONARY EMBOLISM, UNSPECIFIED CHRONICITY, UNSPECIFIED PULMONARY EMBOLISM TYPE, UNSPECIFIED WHETHER ACUTE COR PULMONALE PRESENT (MULTI): ICD-10-CM

## 2025-07-16 LAB
POC INR: 2.3 (ref 0.9–1.1)
POC PROTHROMBIN TIME: ABNORMAL (ref 9.3–12.5)

## 2025-07-16 PROCEDURE — 85610 PROTHROMBIN TIME: CPT | Mod: QW | Performed by: INTERNAL MEDICINE

## 2025-07-16 PROCEDURE — 99211 OFF/OP EST MAY X REQ PHY/QHP: CPT

## 2025-07-16 NOTE — PROGRESS NOTES
Pt enrolled in [facility] for management of Antiphospholipid antibody syndrome (Multi) [D68.61].     Pt current location in clinic.     Current anticoagulant: Warfarin    Time since last visit: 2 weeks    Last INR: 2.00 on warfarin 35 mg in the previous week. No changes were made at that time.    Last Creatinine:   Lab Results   Component Value Date    CREATININE 0.91 06/18/2025     Last hemoglobin/hematocrit:  Lab Results   Component Value Date    HGB 12.1 06/18/2025     Lab Results   Component Value Date    HCT 38.0 06/18/2025       Current INR: 2.30 is therapeutic for goal range of 2.0-3.0 and is reflective of 35 mg in the previous week prior to visit.    Dosing confirmed with patient  Patient denies any missed doses.  Patient denies any medication changes, diet changes, or OTC/herbal supplement changes since last visit.  Patient denies any adverse reactions or barriers.  Patient denies any CP/SOB, fatigue, bleeding or bruising since last visit.   Patient denies any change in alcohol or tobacco use since last visit.   Patient denies any upcoming medical or dental procedures.    Plan:  Patient was instructed to continue with current warfarin dose.  INR follow up will occur in 4 weeks.  Patient was instructed to maintain consistent vegetable intake, to monitor for any bruising or bleeding, and to call with any medication changes or concerns.    Pt handout given with above information    Papo Holden PharmD BCPS  P:[phone]  F:[phone]

## 2025-07-23 ENCOUNTER — APPOINTMENT (OUTPATIENT)
Dept: ORTHOPEDIC SURGERY | Facility: CLINIC | Age: 75
End: 2025-07-23
Payer: MEDICARE

## 2025-07-23 ENCOUNTER — HOSPITAL ENCOUNTER (OUTPATIENT)
Dept: RADIOLOGY | Facility: EXTERNAL LOCATION | Age: 75
Discharge: HOME | End: 2025-07-23

## 2025-07-23 DIAGNOSIS — M17.11 ARTHRITIS OF RIGHT KNEE: ICD-10-CM

## 2025-07-23 PROCEDURE — 20611 DRAIN/INJ JOINT/BURSA W/US: CPT | Performed by: SPECIALIST

## 2025-07-23 PROCEDURE — 1125F AMNT PAIN NOTED PAIN PRSNT: CPT | Performed by: SPECIALIST

## 2025-07-23 PROCEDURE — 1160F RVW MEDS BY RX/DR IN RCRD: CPT | Performed by: SPECIALIST

## 2025-07-23 PROCEDURE — 99214 OFFICE O/P EST MOD 30 MIN: CPT | Performed by: SPECIALIST

## 2025-07-23 PROCEDURE — 1159F MED LIST DOCD IN RCRD: CPT | Performed by: SPECIALIST

## 2025-07-23 ASSESSMENT — PAIN SCALES - GENERAL: PAINLEVEL_OUTOF10: 4

## 2025-07-23 ASSESSMENT — PAIN DESCRIPTION - DESCRIPTORS: DESCRIPTORS: PRESSURE

## 2025-07-23 ASSESSMENT — PAIN - FUNCTIONAL ASSESSMENT: PAIN_FUNCTIONAL_ASSESSMENT: 0-10

## 2025-07-23 NOTE — ASSESSMENT & PLAN NOTE
Assessment: Right knee arthritis    Plan:  She is an excellent candidate for viscosupplementation injections.  Euflexxa 1 of 3 was injected under ultrasound control today.  An excellent lidocaine suppression test.  Follow-up in a week for reevaluation.

## 2025-07-23 NOTE — PROGRESS NOTES
Assessment/Plan   Encounter Diagnoses:  Arthritis of right knee  Arthritis of right knee  Assessment: Right knee arthritis    Plan:  She is an excellent candidate for viscosupplementation injections.  Euflexxa 1 of 3 was injected under ultrasound control today.  An excellent lidocaine suppression test.  Follow-up in a week for reevaluation.       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    Chief Complaint: Injections and Pain of the Right Knee     Last Surgery: No surgery found  Last Surgery Date: No surgery found    HPI  75-year-old comes in today for evaluation of her right knee arthritis.  She has benefited in the past from cortisone injections.  We discussed viscosupplementation injections and obtained approval for the Euflexxa injections.  She wishes to go forward with her first Euflexxa in the right knee.    OBJECTIVE: ORTHO EXAM  Knee exam  Scant effusion  0 to 120 degrees range of motion  There is no warmth or redness about the knee.  She is minimally tender to palpation along the medial joint line.    Ligamentous exam is unchanged.  Thigh is supple and nontender.  Nontender to palpation of her calves bilaterally.  There is supple and nontender.    IMAGE RESULTS:  Point of Care Ultrasound  These images are not reportable by radiology and will not be interpreted   by  Radiologists.      ULTRASOUND      L Inj/Asp: R knee on 7/23/2025 2:50 PM  Indications: pain  Details: 18 G needle, ultrasound-guided superolateral approach  Medications: 20 mg sodium hyaluronate 10 mg/mL(mw 2.4 -3.6 million)  Procedure, treatment alternatives, risks and benefits explained, specific risks discussed. Immediately prior to procedure a time out was called to verify the correct patient, procedure, equipment, support staff and site/side marked as required. Patient was prepped and draped in the usual sterile fashion.            Orders Placed This Encounter    Point of Care Ultrasound       The contents of  this note were dictated into the dragon software.  Sometimes errors in wording, punctuation, or spelling are seen.

## 2025-07-30 ENCOUNTER — APPOINTMENT (OUTPATIENT)
Dept: ORTHOPEDIC SURGERY | Facility: CLINIC | Age: 75
End: 2025-07-30
Payer: MEDICARE

## 2025-07-30 ENCOUNTER — HOSPITAL ENCOUNTER (OUTPATIENT)
Dept: RADIOLOGY | Facility: EXTERNAL LOCATION | Age: 75
Discharge: HOME | End: 2025-07-30

## 2025-07-30 DIAGNOSIS — M17.11 ARTHRITIS OF RIGHT KNEE: ICD-10-CM

## 2025-07-30 PROCEDURE — 1159F MED LIST DOCD IN RCRD: CPT | Performed by: SPECIALIST

## 2025-07-30 PROCEDURE — 1125F AMNT PAIN NOTED PAIN PRSNT: CPT | Performed by: SPECIALIST

## 2025-07-30 PROCEDURE — 1160F RVW MEDS BY RX/DR IN RCRD: CPT | Performed by: SPECIALIST

## 2025-07-30 PROCEDURE — 20611 DRAIN/INJ JOINT/BURSA W/US: CPT | Performed by: SPECIALIST

## 2025-07-30 ASSESSMENT — PAIN SCALES - GENERAL: PAINLEVEL_OUTOF10: 3

## 2025-07-30 ASSESSMENT — PAIN DESCRIPTION - DESCRIPTORS: DESCRIPTORS: DULL

## 2025-07-30 ASSESSMENT — PAIN - FUNCTIONAL ASSESSMENT: PAIN_FUNCTIONAL_ASSESSMENT: 0-10

## 2025-07-30 NOTE — PROGRESS NOTES
Assessment/Plan   Encounter Diagnoses:  Arthritis of right knee  Arthritis of right knee  Assessment: Right knee arthritis    Plan:  She is an excellent candidate for viscosupplementation injections.  Euflexxa 2 of 3 was injected under ultrasound control today.  An excellent lidocaine suppression test.  Follow-up in a week for reevaluation.       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    Chief Complaint: Pain and Injections of the Right Knee     Last Surgery: No surgery found  Last Surgery Date: No surgery found    HPI  75-year-old comes in today for evaluation of her right knee arthritis.  She has benefited in the past from cortisone injections.  We discussed viscosupplementation injections and obtained approval for the Euflexxa injections.  She wishes to go forward with her first Euflexxa in the right knee.    OBJECTIVE: ORTHO EXAM  Knee exam  Scant effusion  0 to 120 degrees range of motion  There is no warmth or redness about the knee.  She is minimally tender to palpation along the medial joint line.    Ligamentous exam is unchanged.  Thigh is supple and nontender.  Nontender to palpation of her calves bilaterally.  There is supple and nontender.    IMAGE RESULTS:  Point of Care Ultrasound  These images are not reportable by radiology and will not be interpreted   by  Radiologists.      ULTRASOUND      L Inj/Asp: R knee on 7/30/2025 1:10 PM  Indications: pain  Details: 18 G needle, ultrasound-guided superolateral approach  Medications: 20 mg sodium hyaluronate 10 mg/mL(mw 2.4 -3.6 million)  Procedure, treatment alternatives, risks and benefits explained, specific risks discussed. Immediately prior to procedure a time out was called to verify the correct patient, procedure, equipment, support staff and site/side marked as required. Patient was prepped and draped in the usual sterile fashion.            Orders Placed This Encounter    Point of Care Ultrasound       The contents of  this note were dictated into the dragon software.  Sometimes errors in wording, punctuation, or spelling are seen.

## 2025-07-30 NOTE — ASSESSMENT & PLAN NOTE
Assessment: Right knee arthritis    Plan:  She is an excellent candidate for viscosupplementation injections.  Euflexxa 2 of 3 was injected under ultrasound control today.  An excellent lidocaine suppression test.  Follow-up in a week for reevaluation.

## 2025-08-06 ENCOUNTER — APPOINTMENT (OUTPATIENT)
Dept: ORTHOPEDIC SURGERY | Facility: CLINIC | Age: 75
End: 2025-08-06
Payer: MEDICARE

## 2025-08-08 ENCOUNTER — OFFICE VISIT (OUTPATIENT)
Dept: ORTHOPEDIC SURGERY | Facility: CLINIC | Age: 75
End: 2025-08-08
Payer: MEDICARE

## 2025-08-08 ENCOUNTER — HOSPITAL ENCOUNTER (OUTPATIENT)
Dept: RADIOLOGY | Facility: EXTERNAL LOCATION | Age: 75
Discharge: HOME | End: 2025-08-08

## 2025-08-08 DIAGNOSIS — M17.11 ARTHRITIS OF RIGHT KNEE: ICD-10-CM

## 2025-08-08 PROCEDURE — 1159F MED LIST DOCD IN RCRD: CPT | Performed by: SPECIALIST

## 2025-08-08 PROCEDURE — 20611 DRAIN/INJ JOINT/BURSA W/US: CPT | Performed by: SPECIALIST

## 2025-08-08 PROCEDURE — 1160F RVW MEDS BY RX/DR IN RCRD: CPT | Performed by: SPECIALIST

## 2025-08-08 NOTE — ASSESSMENT & PLAN NOTE
Assessment: Right knee arthritis    Plan:  She is an excellent candidate for viscosupplementation injections.  Euflexxa 3 of 3 was injected under ultrasound control today.  An excellent lidocaine suppression test.  Follow-up in 2 months

## 2025-08-08 NOTE — PROGRESS NOTES
Assessment/Plan   Encounter Diagnoses:  Arthritis of right knee  Arthritis of right knee  Assessment: Right knee arthritis    Plan:  She is an excellent candidate for viscosupplementation injections.  Euflexxa 3 of 3 was injected under ultrasound control today.  An excellent lidocaine suppression test.  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    Chief Complaint: Injections of the Right Knee     Last Surgery: No surgery found  Last Surgery Date: No surgery found    HPI  75-year-old comes in today for evaluation of her right knee arthritis.  She has benefited in the past from cortisone injections.  We discussed viscosupplementation injections and obtained approval for the Euflexxa injections.  She wishes to go forward with her final Euflexxa in the right knee.    OBJECTIVE: ORTHO EXAM  Knee exam  Scant effusion  0 to 120 degrees range of motion  There is no warmth or redness about the knee.  She is minimally tender to palpation along the medial joint line.    Ligamentous exam is unchanged.  Thigh is supple and nontender.  Nontender to palpation of her calves bilaterally.  There is supple and nontender.    IMAGE RESULTS:  Point of Care Ultrasound  These images are not reportable by radiology and will not be interpreted   by  Radiologists.      ULTRASOUND      L Inj/Asp: R knee on 8/8/2025 8:11 AM  Indications: pain  Details: 18 G needle, ultrasound-guided superolateral approach  Medications: 20 mg sodium hyaluronate 10 mg/mL(mw 2.4 -3.6 million)  Procedure, treatment alternatives, risks and benefits explained, specific risks discussed. Immediately prior to procedure a time out was called to verify the correct patient, procedure, equipment, support staff and site/side marked as required. Patient was prepped and draped in the usual sterile fashion.            Orders Placed This Encounter    Point of Care Ultrasound       The contents of this note were dictated into  the dragon software.  Sometimes errors in wording, punctuation, or spelling are seen.

## 2025-08-13 ENCOUNTER — ANTICOAGULATION - WARFARIN VISIT (OUTPATIENT)
Dept: PHARMACY | Facility: HOSPITAL | Age: 75
End: 2025-08-13
Payer: MEDICARE

## 2025-08-13 DIAGNOSIS — I82.90 DEEP VEIN THROMBOSIS (DVT) OF NON-EXTREMITY VEIN, UNSPECIFIED CHRONICITY: ICD-10-CM

## 2025-08-13 DIAGNOSIS — I74.3 EMBOLISM AND THROMBOSIS OF ARTERIES OF THE LOWER EXTREMITIES: ICD-10-CM

## 2025-08-13 DIAGNOSIS — D68.61 ANTIPHOSPHOLIPID ANTIBODY SYNDROME (MULTI): Primary | ICD-10-CM

## 2025-08-13 DIAGNOSIS — I26.99 PULMONARY EMBOLISM, UNSPECIFIED CHRONICITY, UNSPECIFIED PULMONARY EMBOLISM TYPE, UNSPECIFIED WHETHER ACUTE COR PULMONALE PRESENT (MULTI): ICD-10-CM

## 2025-08-13 DIAGNOSIS — I82.4Y9 DEEP VEIN THROMBOSIS (DVT) OF PROXIMAL LOWER EXTREMITY, UNSPECIFIED CHRONICITY, UNSPECIFIED LATERALITY: ICD-10-CM

## 2025-08-13 LAB
POC INR: 1.3 (ref 2–3)
POC PROTHROMBIN TIME: ABNORMAL (ref 9.3–12.5)

## 2025-08-13 PROCEDURE — 85610 PROTHROMBIN TIME: CPT | Mod: QW | Performed by: INTERNAL MEDICINE

## 2025-08-13 PROCEDURE — 99211 OFF/OP EST MAY X REQ PHY/QHP: CPT | Performed by: PHARMACIST

## 2025-08-20 ENCOUNTER — APPOINTMENT (OUTPATIENT)
Dept: PHARMACY | Facility: HOSPITAL | Age: 75
End: 2025-08-20
Payer: MEDICARE

## 2025-08-25 ENCOUNTER — ANTICOAGULATION - WARFARIN VISIT (OUTPATIENT)
Dept: PHARMACY | Facility: HOSPITAL | Age: 75
End: 2025-08-25
Payer: MEDICARE

## 2025-08-25 DIAGNOSIS — Z86.711 PERSONAL HISTORY OF PE (PULMONARY EMBOLISM): Primary | ICD-10-CM

## 2025-08-25 DIAGNOSIS — D68.61 ANTIPHOSPHOLIPID ANTIBODY SYNDROME (MULTI): ICD-10-CM

## 2025-08-25 DIAGNOSIS — Z86.718 PERSONAL HISTORY OF VENOUS THROMBOSIS AND EMBOLUS: ICD-10-CM

## 2025-08-25 LAB
POC INR: 2.3 (ref 0.9–1.1)
POC PROTHROMBIN TIME: ABNORMAL (ref 9.3–12.5)

## 2025-08-25 PROCEDURE — 85610 PROTHROMBIN TIME: CPT | Mod: QW

## 2025-08-25 PROCEDURE — 99211 OFF/OP EST MAY X REQ PHY/QHP: CPT | Performed by: PHARMACIST

## 2025-09-02 ENCOUNTER — APPOINTMENT (OUTPATIENT)
Dept: RADIOLOGY | Facility: HOSPITAL | Age: 75
End: 2025-09-02
Payer: MEDICARE

## 2025-09-02 ENCOUNTER — HOSPITAL ENCOUNTER (EMERGENCY)
Facility: HOSPITAL | Age: 75
Discharge: HOME | End: 2025-09-02
Payer: MEDICARE

## 2025-09-02 ENCOUNTER — PHARMACY VISIT (OUTPATIENT)
Dept: PHARMACY | Facility: CLINIC | Age: 75
End: 2025-09-02
Payer: COMMERCIAL

## 2025-09-02 VITALS
OXYGEN SATURATION: 98 % | RESPIRATION RATE: 18 BRPM | TEMPERATURE: 97.8 F | HEIGHT: 64 IN | WEIGHT: 190 LBS | DIASTOLIC BLOOD PRESSURE: 87 MMHG | SYSTOLIC BLOOD PRESSURE: 169 MMHG | BODY MASS INDEX: 32.44 KG/M2 | HEART RATE: 58 BPM
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
## 2025-09-02 DIAGNOSIS — M54.32 SCIATICA OF LEFT SIDE: ICD-10-CM

## 2025-09-02 PROCEDURE — 2500000001 HC RX 250 WO HCPCS SELF ADMINISTERED DRUGS (ALT 637 FOR MEDICARE OP): Performed by: NURSE PRACTITIONER

## 2025-09-02 PROCEDURE — 73502 X-RAY EXAM HIP UNI 2-3 VIEWS: CPT | Mod: LT

## 2025-09-02 PROCEDURE — 72100 X-RAY EXAM L-S SPINE 2/3 VWS: CPT | Mod: FOREIGN READ | Performed by: RADIOLOGY

## 2025-09-02 PROCEDURE — 72100 X-RAY EXAM L-S SPINE 2/3 VWS: CPT

## 2025-09-02 PROCEDURE — 73564 X-RAY EXAM KNEE 4 OR MORE: CPT | Mod: LEFT SIDE | Performed by: RADIOLOGY

## 2025-09-02 PROCEDURE — RXMED WILLOW AMBULATORY MEDICATION CHARGE

## 2025-09-02 PROCEDURE — 99284 EMERGENCY DEPT VISIT MOD MDM: CPT

## 2025-09-02 PROCEDURE — 73564 X-RAY EXAM KNEE 4 OR MORE: CPT | Mod: LT

## 2025-09-02 PROCEDURE — 73502 X-RAY EXAM HIP UNI 2-3 VIEWS: CPT | Mod: LEFT SIDE | Performed by: RADIOLOGY

## 2025-09-02 RX ORDER — CYCLOBENZAPRINE HCL 10 MG
10 TABLET ORAL 3 TIMES DAILY PRN
Qty: 10 TABLET | Refills: 0 | Status: SHIPPED | OUTPATIENT
Start: 2025-09-02 | End: 2025-09-12

## 2025-09-02 RX ORDER — CYCLOBENZAPRINE HCL 10 MG
10 TABLET ORAL ONCE
Status: COMPLETED | OUTPATIENT
Start: 2025-09-02 | End: 2025-09-02

## 2025-09-02 RX ADMIN — CYCLOBENZAPRINE 10 MG: 10 TABLET, FILM COATED ORAL at 14:13

## 2025-09-02 ASSESSMENT — PAIN SCALES - GENERAL
PAINLEVEL_OUTOF10: 10 - WORST POSSIBLE PAIN

## 2025-09-02 ASSESSMENT — PAIN DESCRIPTION - PAIN TYPE: TYPE: ACUTE PAIN

## 2025-09-02 ASSESSMENT — PAIN - FUNCTIONAL ASSESSMENT: PAIN_FUNCTIONAL_ASSESSMENT: 0-10

## 2025-09-17 ENCOUNTER — APPOINTMENT (OUTPATIENT)
Dept: ORTHOPEDIC SURGERY | Facility: CLINIC | Age: 75
End: 2025-09-17
Payer: MEDICARE

## 2025-10-15 ENCOUNTER — APPOINTMENT (OUTPATIENT)
Dept: CARDIOLOGY | Facility: CLINIC | Age: 75
End: 2025-10-15
Payer: COMMERCIAL

## 2025-12-03 ENCOUNTER — APPOINTMENT (OUTPATIENT)
Dept: PRIMARY CARE | Facility: CLINIC | Age: 75
End: 2025-12-03
Payer: MEDICARE